# Patient Record
Sex: MALE | Race: BLACK OR AFRICAN AMERICAN | NOT HISPANIC OR LATINO
[De-identification: names, ages, dates, MRNs, and addresses within clinical notes are randomized per-mention and may not be internally consistent; named-entity substitution may affect disease eponyms.]

---

## 2017-08-29 ENCOUNTER — APPOINTMENT (OUTPATIENT)
Dept: GASTROENTEROLOGY | Facility: CLINIC | Age: 62
End: 2017-08-29
Payer: MEDICAID

## 2017-08-29 ENCOUNTER — LABORATORY RESULT (OUTPATIENT)
Age: 62
End: 2017-08-29

## 2017-08-29 VITALS
RESPIRATION RATE: 15 BRPM | WEIGHT: 190 LBS | SYSTOLIC BLOOD PRESSURE: 147 MMHG | TEMPERATURE: 98 F | HEART RATE: 90 BPM | HEIGHT: 69 IN | BODY MASS INDEX: 28.14 KG/M2 | DIASTOLIC BLOOD PRESSURE: 84 MMHG

## 2017-08-29 DIAGNOSIS — I85.01 ESOPHAGEAL VARICES WITH BLEEDING: ICD-10-CM

## 2017-08-29 DIAGNOSIS — Z86.79 PERSONAL HISTORY OF OTHER DISEASES OF THE CIRCULATORY SYSTEM: ICD-10-CM

## 2017-08-29 DIAGNOSIS — K74.60 UNSPECIFIED CIRRHOSIS OF LIVER: ICD-10-CM

## 2017-08-29 PROCEDURE — 99205 OFFICE O/P NEW HI 60 MIN: CPT

## 2017-08-29 RX ORDER — FOLIC ACID 1 MG/1
1 TABLET ORAL
Qty: 30 | Refills: 0 | Status: ACTIVE | COMMUNITY
Start: 2017-02-10

## 2017-08-30 LAB
ALBUMIN SERPL ELPH-MCNC: 3.2 G/DL
ALP BLD-CCNC: 91 U/L
ALT SERPL-CCNC: 50 U/L
ANION GAP SERPL CALC-SCNC: 20 MMOL/L
AST SERPL-CCNC: 176 U/L
BASOPHILS # BLD AUTO: 0.03 K/UL
BASOPHILS NFR BLD AUTO: 0.7 %
BILIRUB SERPL-MCNC: 1.5 MG/DL
BUN SERPL-MCNC: 24 MG/DL
CALCIUM SERPL-MCNC: 9.4 MG/DL
CHLORIDE SERPL-SCNC: 102 MMOL/L
CO2 SERPL-SCNC: 18 MMOL/L
CREAT SERPL-MCNC: 1.31 MG/DL
EOSINOPHIL # BLD AUTO: 0.14 K/UL
EOSINOPHIL NFR BLD AUTO: 3.3 %
GLUCOSE SERPL-MCNC: 81 MG/DL
HCT VFR BLD CALC: 35 %
HGB BLD-MCNC: 11.4 G/DL
IMM GRANULOCYTES NFR BLD AUTO: 0.2 %
INR PPP: 1.16 RATIO
LYMPHOCYTES # BLD AUTO: 0.87 K/UL
LYMPHOCYTES NFR BLD AUTO: 20.7 %
MAN DIFF?: NORMAL
MCHC RBC-ENTMCNC: 32.6 GM/DL
MCHC RBC-ENTMCNC: 35.2 PG
MCV RBC AUTO: 108 FL
MONOCYTES # BLD AUTO: 0.52 K/UL
MONOCYTES NFR BLD AUTO: 12.4 %
NEUTROPHILS # BLD AUTO: 2.64 K/UL
NEUTROPHILS NFR BLD AUTO: 62.7 %
PLATELET # BLD AUTO: 78 K/UL
POTASSIUM SERPL-SCNC: 4.4 MMOL/L
PROT SERPL-MCNC: 9 G/DL
PT BLD: 13.2 SEC
RBC # BLD: 3.24 M/UL
RBC # FLD: 14.5 %
SODIUM SERPL-SCNC: 140 MMOL/L
WBC # FLD AUTO: 4.21 K/UL

## 2017-09-27 ENCOUNTER — OTHER (OUTPATIENT)
Age: 62
End: 2017-09-27

## 2017-09-27 ENCOUNTER — OUTPATIENT (OUTPATIENT)
Dept: OUTPATIENT SERVICES | Facility: HOSPITAL | Age: 62
LOS: 1 days | Discharge: HOME | End: 2017-09-27
Payer: MEDICAID

## 2017-09-27 DIAGNOSIS — I85.01 ESOPHAGEAL VARICES WITH BLEEDING: ICD-10-CM

## 2017-09-27 PROCEDURE — 43244 EGD VARICES LIGATION: CPT

## 2017-09-27 RX ORDER — CARVEDILOL 6.25 MG/1
6.25 TABLET, FILM COATED ORAL TWICE DAILY
Qty: 60 | Refills: 2 | Status: ACTIVE | COMMUNITY
Start: 2017-09-27 | End: 1900-01-01

## 2017-09-27 RX ORDER — CARVEDILOL 6.25 MG/1
6.25 TABLET, FILM COATED ORAL TWICE DAILY
Qty: 60 | Refills: 3 | Status: ACTIVE | COMMUNITY
Start: 2017-09-27 | End: 1900-01-01

## 2017-10-01 DIAGNOSIS — K76.6 PORTAL HYPERTENSION: ICD-10-CM

## 2017-10-01 DIAGNOSIS — D50.0 IRON DEFICIENCY ANEMIA SECONDARY TO BLOOD LOSS (CHRONIC): ICD-10-CM

## 2017-10-01 DIAGNOSIS — I85.01 ESOPHAGEAL VARICES WITH BLEEDING: ICD-10-CM

## 2017-10-01 DIAGNOSIS — F10.10 ALCOHOL ABUSE, UNCOMPLICATED: ICD-10-CM

## 2017-10-01 DIAGNOSIS — K25.9 GASTRIC ULCER, UNSPECIFIED AS ACUTE OR CHRONIC, WITHOUT HEMORRHAGE OR PERFORATION: ICD-10-CM

## 2017-10-01 DIAGNOSIS — K29.80 DUODENITIS WITHOUT BLEEDING: ICD-10-CM

## 2017-10-01 DIAGNOSIS — K31.89 OTHER DISEASES OF STOMACH AND DUODENUM: ICD-10-CM

## 2017-10-01 DIAGNOSIS — K74.60 UNSPECIFIED CIRRHOSIS OF LIVER: ICD-10-CM

## 2017-10-17 ENCOUNTER — LABORATORY RESULT (OUTPATIENT)
Age: 62
End: 2017-10-17

## 2017-10-17 ENCOUNTER — APPOINTMENT (OUTPATIENT)
Dept: GASTROENTEROLOGY | Facility: CLINIC | Age: 62
End: 2017-10-17
Payer: MEDICAID

## 2017-10-17 VITALS
SYSTOLIC BLOOD PRESSURE: 114 MMHG | TEMPERATURE: 97.8 F | BODY MASS INDEX: 26.66 KG/M2 | DIASTOLIC BLOOD PRESSURE: 68 MMHG | HEIGHT: 69 IN | HEART RATE: 74 BPM | WEIGHT: 180 LBS | RESPIRATION RATE: 15 BRPM

## 2017-10-17 PROCEDURE — 99215 OFFICE O/P EST HI 40 MIN: CPT

## 2017-10-18 LAB
ALBUMIN SERPL ELPH-MCNC: 3 G/DL
ALP BLD-CCNC: 82 U/L
ALT SERPL-CCNC: 26 U/L
ANION GAP SERPL CALC-SCNC: 12 MMOL/L
AST SERPL-CCNC: 100 U/L
BASOPHILS # BLD AUTO: 0.01 K/UL
BASOPHILS NFR BLD AUTO: 0.3 %
BILIRUB SERPL-MCNC: 2.5 MG/DL
BUN SERPL-MCNC: 29 MG/DL
CALCIUM SERPL-MCNC: 9.6 MG/DL
CHLORIDE SERPL-SCNC: 107 MMOL/L
CO2 SERPL-SCNC: 18 MMOL/L
CREAT SERPL-MCNC: 1.27 MG/DL
EOSINOPHIL # BLD AUTO: 0.18 K/UL
EOSINOPHIL NFR BLD AUTO: 4.7 %
GLUCOSE SERPL-MCNC: 89 MG/DL
HCT VFR BLD CALC: 30.8 %
HGB BLD-MCNC: 10.2 G/DL
IMM GRANULOCYTES NFR BLD AUTO: 0.3 %
INR PPP: 1.22 RATIO
LYMPHOCYTES # BLD AUTO: 0.64 K/UL
LYMPHOCYTES NFR BLD AUTO: 16.7 %
MAN DIFF?: NORMAL
MCHC RBC-ENTMCNC: 33.1 GM/DL
MCHC RBC-ENTMCNC: 36.4 PG
MCV RBC AUTO: 110 FL
MONOCYTES # BLD AUTO: 0.5 K/UL
MONOCYTES NFR BLD AUTO: 13 %
NEUTROPHILS # BLD AUTO: 2.5 K/UL
NEUTROPHILS NFR BLD AUTO: 65 %
PLATELET # BLD AUTO: 68 K/UL
POTASSIUM SERPL-SCNC: 4.5 MMOL/L
PROT SERPL-MCNC: 8.4 G/DL
PT BLD: 13.8 SEC
RBC # BLD: 2.8 M/UL
RBC # FLD: 14.8 %
SODIUM SERPL-SCNC: 137 MMOL/L
WBC # FLD AUTO: 3.84 K/UL

## 2017-10-20 LAB — UREA BREATH TEST QL: NEGATIVE

## 2017-11-13 ENCOUNTER — OUTPATIENT (OUTPATIENT)
Dept: OUTPATIENT SERVICES | Facility: HOSPITAL | Age: 62
LOS: 1 days | End: 2017-11-13
Payer: MEDICAID

## 2017-11-13 ENCOUNTER — OTHER (OUTPATIENT)
Age: 62
End: 2017-11-13

## 2017-11-13 DIAGNOSIS — I85.00 ESOPHAGEAL VARICES WITHOUT BLEEDING: ICD-10-CM

## 2017-11-13 PROCEDURE — 43244 EGD VARICES LIGATION: CPT

## 2017-11-13 RX ORDER — PANTOPRAZOLE 40 MG/1
40 TABLET, DELAYED RELEASE ORAL
Qty: 30 | Refills: 2 | Status: ACTIVE | COMMUNITY
Start: 2017-09-27 | End: 1900-01-01

## 2017-11-28 ENCOUNTER — LABORATORY RESULT (OUTPATIENT)
Age: 62
End: 2017-11-28

## 2017-11-28 ENCOUNTER — APPOINTMENT (OUTPATIENT)
Dept: GASTROENTEROLOGY | Facility: CLINIC | Age: 62
End: 2017-11-28
Payer: MEDICAID

## 2017-11-28 VITALS
BODY MASS INDEX: 26.81 KG/M2 | HEIGHT: 69 IN | TEMPERATURE: 98 F | SYSTOLIC BLOOD PRESSURE: 126 MMHG | RESPIRATION RATE: 16 BRPM | HEART RATE: 74 BPM | WEIGHT: 181 LBS | DIASTOLIC BLOOD PRESSURE: 78 MMHG

## 2017-11-28 PROCEDURE — 99214 OFFICE O/P EST MOD 30 MIN: CPT

## 2017-11-29 LAB
INR PPP: 1.25 RATIO
PT BLD: 14.2 SEC

## 2017-12-11 LAB
ALBUMIN SERPL ELPH-MCNC: 2.9 G/DL
ALP BLD-CCNC: 68 U/L
ALT SERPL-CCNC: 24 U/L
ANION GAP SERPL CALC-SCNC: 14 MMOL/L
AST SERPL-CCNC: 60 U/L
BASOPHILS # BLD AUTO: 0.02 K/UL
BASOPHILS NFR BLD AUTO: 0.5 %
BILIRUB SERPL-MCNC: 1.6 MG/DL
BUN SERPL-MCNC: 23 MG/DL
CALCIUM SERPL-MCNC: 10.4 MG/DL
CHLORIDE SERPL-SCNC: 104 MMOL/L
CO2 SERPL-SCNC: 22 MMOL/L
CREAT SERPL-MCNC: 1.33 MG/DL
EOSINOPHIL # BLD AUTO: 0.16 K/UL
EOSINOPHIL NFR BLD AUTO: 4.2 %
GLUCOSE SERPL-MCNC: 89 MG/DL
HCT VFR BLD CALC: 29.5 %
HGB BLD-MCNC: 10 G/DL
IMM GRANULOCYTES NFR BLD AUTO: 0.3 %
LYMPHOCYTES # BLD AUTO: 0.73 K/UL
LYMPHOCYTES NFR BLD AUTO: 19.2 %
MAN DIFF?: NORMAL
MCHC RBC-ENTMCNC: 33.9 GM/DL
MCHC RBC-ENTMCNC: 37.7 PG
MCV RBC AUTO: 111.3 FL
MONOCYTES # BLD AUTO: 0.43 K/UL
MONOCYTES NFR BLD AUTO: 11.3 %
NEUTROPHILS # BLD AUTO: 2.46 K/UL
NEUTROPHILS NFR BLD AUTO: 64.5 %
PLATELET # BLD AUTO: 60 K/UL
POTASSIUM SERPL-SCNC: 4 MMOL/L
PROT SERPL-MCNC: 8.3 G/DL
RBC # BLD: 2.65 M/UL
RBC # FLD: 13.8 %
SODIUM SERPL-SCNC: 140 MMOL/L
WBC # FLD AUTO: 3.81 K/UL

## 2018-01-22 ENCOUNTER — OUTPATIENT (OUTPATIENT)
Dept: OUTPATIENT SERVICES | Facility: HOSPITAL | Age: 63
LOS: 1 days | End: 2018-01-22
Payer: MEDICAID

## 2018-01-22 DIAGNOSIS — D64.9 ANEMIA, UNSPECIFIED: ICD-10-CM

## 2018-01-22 PROCEDURE — 43235 EGD DIAGNOSTIC BRUSH WASH: CPT

## 2018-01-22 PROCEDURE — 43239 EGD BIOPSY SINGLE/MULTIPLE: CPT

## 2018-02-15 ENCOUNTER — APPOINTMENT (OUTPATIENT)
Dept: GASTROENTEROLOGY | Facility: CLINIC | Age: 63
End: 2018-02-15
Payer: MEDICAID

## 2018-02-15 VITALS
SYSTOLIC BLOOD PRESSURE: 134 MMHG | HEART RATE: 90 BPM | DIASTOLIC BLOOD PRESSURE: 66 MMHG | TEMPERATURE: 98.8 F | BODY MASS INDEX: 26.81 KG/M2 | WEIGHT: 181 LBS | RESPIRATION RATE: 18 BRPM | HEIGHT: 69 IN

## 2018-02-15 PROCEDURE — 99214 OFFICE O/P EST MOD 30 MIN: CPT

## 2018-02-15 RX ORDER — CARVEDILOL 6.25 MG/1
6.25 TABLET, FILM COATED ORAL TWICE DAILY
Qty: 60 | Refills: 3 | Status: ACTIVE | COMMUNITY
Start: 2017-08-29 | End: 1900-01-01

## 2018-02-16 ENCOUNTER — EMERGENCY (EMERGENCY)
Facility: HOSPITAL | Age: 63
LOS: 1 days | Discharge: HOME | End: 2018-02-16
Attending: EMERGENCY MEDICINE | Admitting: EMERGENCY MEDICINE
Payer: MEDICAID

## 2018-02-16 VITALS
TEMPERATURE: 98 F | DIASTOLIC BLOOD PRESSURE: 60 MMHG | RESPIRATION RATE: 16 BRPM | SYSTOLIC BLOOD PRESSURE: 96 MMHG | HEART RATE: 77 BPM | OXYGEN SATURATION: 100 %

## 2018-02-16 PROCEDURE — 99284 EMERGENCY DEPT VISIT MOD MDM: CPT

## 2018-02-16 NOTE — ED ADULT TRIAGE NOTE - CHIEF COMPLAINT QUOTE
Arrives with ems, picked up on a street , patient called because he was feeling dizzy. Patient admits to having some drinks tonight, hypotensive in triage, denies any chest pain.

## 2018-02-17 VITALS
TEMPERATURE: 98 F | DIASTOLIC BLOOD PRESSURE: 60 MMHG | SYSTOLIC BLOOD PRESSURE: 100 MMHG | HEART RATE: 85 BPM | OXYGEN SATURATION: 97 % | RESPIRATION RATE: 16 BRPM

## 2018-02-17 LAB
ALBUMIN SERPL ELPH-MCNC: 2.8 G/DL — LOW (ref 3.3–5)
ALP SERPL-CCNC: 73 U/L — SIGNIFICANT CHANGE UP (ref 40–120)
ALT FLD-CCNC: 48 U/L — HIGH (ref 4–41)
ANISOCYTOSIS BLD QL: SLIGHT — SIGNIFICANT CHANGE UP
AST SERPL-CCNC: 135 U/L — HIGH (ref 4–40)
BASOPHILS # BLD AUTO: 0.03 K/UL — SIGNIFICANT CHANGE UP (ref 0–0.2)
BASOPHILS NFR BLD AUTO: 0.8 % — SIGNIFICANT CHANGE UP (ref 0–2)
BILIRUB SERPL-MCNC: 2.2 MG/DL — HIGH (ref 0.2–1.2)
BUN SERPL-MCNC: 36 MG/DL — HIGH (ref 7–23)
CALCIUM SERPL-MCNC: 7.5 MG/DL — LOW (ref 8.4–10.5)
CHLORIDE SERPL-SCNC: 102 MMOL/L — SIGNIFICANT CHANGE UP (ref 98–107)
CO2 SERPL-SCNC: 16 MMOL/L — LOW (ref 22–31)
CREAT SERPL-MCNC: 2.88 MG/DL — HIGH (ref 0.5–1.3)
EOSINOPHIL # BLD AUTO: 0.07 K/UL — SIGNIFICANT CHANGE UP (ref 0–0.5)
EOSINOPHIL NFR BLD AUTO: 1.8 % — SIGNIFICANT CHANGE UP (ref 0–6)
GLUCOSE SERPL-MCNC: 106 MG/DL — HIGH (ref 70–99)
HCT VFR BLD CALC: 29.6 % — LOW (ref 39–50)
HGB BLD-MCNC: 9.9 G/DL — LOW (ref 13–17)
IMM GRANULOCYTES # BLD AUTO: 0.03 # — SIGNIFICANT CHANGE UP
IMM GRANULOCYTES NFR BLD AUTO: 0.8 % — SIGNIFICANT CHANGE UP (ref 0–1.5)
LYMPHOCYTES # BLD AUTO: 0.53 K/UL — LOW (ref 1–3.3)
LYMPHOCYTES # BLD AUTO: 13.9 % — SIGNIFICANT CHANGE UP (ref 13–44)
MACROCYTES BLD QL: SIGNIFICANT CHANGE UP
MANUAL SMEAR VERIFICATION: SIGNIFICANT CHANGE UP
MCHC RBC-ENTMCNC: 33.4 % — SIGNIFICANT CHANGE UP (ref 32–36)
MCHC RBC-ENTMCNC: 36.1 PG — HIGH (ref 27–34)
MCV RBC AUTO: 108 FL — HIGH (ref 80–100)
MONOCYTES # BLD AUTO: 0.61 K/UL — SIGNIFICANT CHANGE UP (ref 0–0.9)
MONOCYTES NFR BLD AUTO: 16.1 % — HIGH (ref 2–14)
NEUTROPHILS # BLD AUTO: 2.53 K/UL — SIGNIFICANT CHANGE UP (ref 1.8–7.4)
NEUTROPHILS NFR BLD AUTO: 66.6 % — SIGNIFICANT CHANGE UP (ref 43–77)
NRBC # FLD: 0 — SIGNIFICANT CHANGE UP
PLATELET # BLD AUTO: 49 K/UL — LOW (ref 150–400)
PLATELET COUNT - ESTIMATE: SIGNIFICANT CHANGE UP
PMV BLD: 10.6 FL — SIGNIFICANT CHANGE UP (ref 7–13)
POLYCHROMASIA BLD QL SMEAR: SLIGHT — SIGNIFICANT CHANGE UP
POTASSIUM SERPL-MCNC: 4.2 MMOL/L — SIGNIFICANT CHANGE UP (ref 3.5–5.3)
POTASSIUM SERPL-SCNC: 4.2 MMOL/L — SIGNIFICANT CHANGE UP (ref 3.5–5.3)
PROT SERPL-MCNC: 8.1 G/DL — SIGNIFICANT CHANGE UP (ref 6–8.3)
RBC # BLD: 2.74 M/UL — LOW (ref 4.2–5.8)
RBC # FLD: 15.1 % — HIGH (ref 10.3–14.5)
SODIUM SERPL-SCNC: 138 MMOL/L — SIGNIFICANT CHANGE UP (ref 135–145)
WBC # BLD: 3.8 K/UL — SIGNIFICANT CHANGE UP (ref 3.8–10.5)
WBC # FLD AUTO: 3.8 K/UL — SIGNIFICANT CHANGE UP (ref 3.8–10.5)

## 2018-02-17 RX ORDER — SODIUM CHLORIDE 9 MG/ML
2000 INJECTION INTRAMUSCULAR; INTRAVENOUS; SUBCUTANEOUS ONCE
Qty: 0 | Refills: 0 | Status: COMPLETED | OUTPATIENT
Start: 2018-02-17 | End: 2018-02-17

## 2018-02-17 RX ADMIN — SODIUM CHLORIDE 2000 MILLILITER(S): 9 INJECTION INTRAMUSCULAR; INTRAVENOUS; SUBCUTANEOUS at 03:33

## 2018-02-17 NOTE — ED PROVIDER NOTE - MEDICAL DECISION MAKING DETAILS
62M ETOH abuse p/w dizziness 62M ETOH abuse p/w dizziness likely 2/2 ETOH use. Low concern for cardiac etiology. No signs of obvious trauma. Will observe, give IVF and reassess

## 2018-02-17 NOTE — ED PROVIDER NOTE - OBJECTIVE STATEMENT
62M PMH ETOH abuse with cirrhosis BIBEMS after feeling lightheaded without vertiginous sensation and calling 911. Denies chest pain, abd pain, n/v/d. Pt endorses recent procedure but unsure what. Pt reports he drank "one vodka" today and usually drinks two vodkas daily.

## 2018-02-17 NOTE — ED PROVIDER NOTE - ATTENDING CONTRIBUTION TO CARE
DR. MURGUIA, ATTENDING MD-  I performed a face to face bedside interview with patient regarding history of present illness, review of symptoms and past medical history. I completed an independent physical exam.  I have discussed patient's plan of care with the resident.   Documentation as above in the note.    61 y/o male with h/o etoh abuse, cirrhosis bibems for lightheadedness this pm.  Gradual onset, intermittent, lasts few seconds, about once an hour.  States he drinks vodka daily, admits to two shots tonight.  Denies f/c, ha, neck stiffness, cp, sob, cough, abd pain, n/v/d, dysuria, rash.  Afebrile, vs wnl, dry mm, nad, etoh on breath, ctabil, s1s2 rrr no m/r/g, abd soft non ttp no r/g, no cva tenderness b/l, no leg swelling b/l, no rash.  Eval for anemia vs dehydration vs lyte abn.  Obtain cbc, bmp, give ivf bolus, reassess.

## 2018-02-17 NOTE — ED PROVIDER NOTE - CARE PLAN
Principal Discharge DX:	Lightheaded Principal Discharge DX:	Lightheaded  Assessment and plan of treatment:	Follow up with your PMD in 48 hours. Return to the ED if dizziness persists, you have chest pain, shortness of breath, vomiting, or any other concerning symptom.

## 2018-02-17 NOTE — ED ADULT NURSE REASSESSMENT NOTE - NS ED NURSE REASSESS COMMENT FT1
No acute distress at present. Respirations are even and unlabored on room air. Pt. is being discharged. Discharge teaching and IV removed by MD Conroy.

## 2018-02-17 NOTE — ED PROVIDER NOTE - PLAN OF CARE
Follow up with your PMD in 48 hours. Return to the ED if dizziness persists, you have chest pain, shortness of breath, vomiting, or any other concerning symptom.

## 2018-02-17 NOTE — ED ADULT NURSE NOTE - OBJECTIVE STATEMENT
Pt received A&Ox3 to ED Rm 28 with c/o dizziness/jitteriness today. Denies LOC, falls, CP, SOB or any other adverse symptom at this time. States he went in for a f/u to his endoscopy from a few weeks ago & was told everything was good, but began to feel weak today. Admits to drinking 3 vodkas earlier. Jaundice noted to bilat eyes. RR equal & unlabored. Awaiting MD orders.

## 2018-02-17 NOTE — ED ADULT NURSE REASSESSMENT NOTE - NS ED NURSE REASSESS COMMENT FT1
No acute distress at present. Respirations are even and unlabored on room air. VS as noted. Will continue to monitor.

## 2018-02-24 ENCOUNTER — EMERGENCY (EMERGENCY)
Facility: HOSPITAL | Age: 63
LOS: 1 days | Discharge: LEFT BEFORE DISCHARGE | End: 2018-02-24
Attending: EMERGENCY MEDICINE | Admitting: EMERGENCY MEDICINE
Payer: MEDICAID

## 2018-02-24 VITALS
RESPIRATION RATE: 16 BRPM | TEMPERATURE: 98 F | HEART RATE: 78 BPM | OXYGEN SATURATION: 97 % | DIASTOLIC BLOOD PRESSURE: 90 MMHG | SYSTOLIC BLOOD PRESSURE: 117 MMHG

## 2018-02-24 PROCEDURE — 99283 EMERGENCY DEPT VISIT LOW MDM: CPT

## 2018-02-24 NOTE — ED PROVIDER NOTE - OBJECTIVE STATEMENT
63yo male PMH ETOH abuse with cirrhosis, HTN, gout, BIBEMS for alcohol intoxication. Patient states that he drank 2 vodka drinks today, last drink 2pm. Patient states that he was dizzy earlier today but is now feeling fine. No chest pain, shortness of breath, palpitations, nausea, vomiting, diarrhea, weakness. Denies falling. patient seen yesterday for same complaint.

## 2018-02-24 NOTE — ED ADULT NURSE NOTE - OBJECTIVE STATEMENT
Pt. received in room 26. Pt. is A&O x3 and ambulatory at baseline. Pt. Pt. received in room 26. Pt. is A&O x3 and ambulatory at baseline. Pt. reports drinking "drinks of vodka today," last drink at 2 pm. Pt. also reports feeling dizzy earlier, denies dizziness at present. Pt. denies chest pain, SOB, palpitations, abdominal pain, n/v/d, weakness, falls, trauma. Respirations are even and unlabored on room air. Pt. belongings (1 pair of shoes, 1 pair of socks, 1 jacket, 1 belt, 2 pairs of pants, 2 shirts, 1 hat, 1 sweater) 2 bags secured in . 1 nail clipper, 1 cell phone, 1 [place] card, and money secured with security with MARYANNE Schilling.

## 2018-02-24 NOTE — ED ADULT TRIAGE NOTE - CHIEF COMPLAINT QUOTE
Pt brought in by EMS from street with ETOH intox; admits to "4 drinks of vodka." States intermit dizziness - none noted now. Denies pain or any other adverse symptoms at this time.

## 2018-02-24 NOTE — ED PROVIDER NOTE - PROGRESS NOTE DETAILS
Patient refused blood draws. Patient states that he would like to go home but he has no place to go. Spoke with charge RN, Sona, coming to speak to patient. Sona Sarabia DO The patient has the capacity to refuse further medical evaluation and care. I had a lengthy discussion with the patient in which appropriate further evaluation and treatment was offered to the patient, and they declined. The patient understands that as a consequence of this decision they may be at risk for clinical deterioration including permanent disability and death, and the patient verbalized this understanding. Clear return precautions were discussed. The patient was urged to seek follow-up care, with appropriate referrals made as needed.

## 2018-02-24 NOTE — ED PROVIDER NOTE - ATTENDING CONTRIBUTION TO CARE
Ariel  pt with h/o ETOH abuse  here c/o dizziness earlier after drinking which is now resolved and he wants to leave He presently has no complaints  Review of recent labs showed in creased creatinine  but pt refused labs  When I told him the reason, he told me he had been told that and wanted no further bloodwork at thiss time  Risks of kidney dysfunction explained  Ptr was alert and oriented x 3  answering questionas appropriately  with stable gait  clear lungs  benign abd  card RRR S1S2

## 2018-02-24 NOTE — ED PROVIDER NOTE - MEDICAL DECISION MAKING DETAILS
63yo male with PMH EtOH abuse, cirrhosis, HTN, presenting with intermittent dizziness. Found to have elevated creatinine during last ED visit, patient offered labs and medical evaluation, but declined. Patient is AAOx3, clinically sober, ambulating without difficulty. Will discharge AMA. Sona Sarabia DO

## 2018-02-24 NOTE — ED PROVIDER NOTE - PHYSICAL EXAMINATION
Gen: NAD, AOx3, slurring speech  Head: NCAT, no c-spine tenderness  HEENT: PERRL, oral mucosa moist  Lung: CTAB, no respiratory distress, no wheezing, rales, rhonchi  CV: normal s1/s2, rrr, no murmurs, Normal perfusion  Abd: soft, NTND  MSK: No edema, no visible deformities, full range of motion in all 4 extremities  Neuro: CN II-XII grossly intact, No focal neurologic deficits, moving all extremities, normal strength  Skin: No rash

## 2018-02-25 VITALS
HEART RATE: 76 BPM | DIASTOLIC BLOOD PRESSURE: 60 MMHG | SYSTOLIC BLOOD PRESSURE: 107 MMHG | TEMPERATURE: 98 F | RESPIRATION RATE: 16 BRPM | OXYGEN SATURATION: 96 %

## 2018-02-25 RX ORDER — ACETAMINOPHEN 500 MG
650 TABLET ORAL ONCE
Qty: 0 | Refills: 0 | Status: COMPLETED | OUTPATIENT
Start: 2018-02-25 | End: 2018-02-25

## 2018-02-25 NOTE — ED ADULT NURSE REASSESSMENT NOTE - NS ED NURSE REASSESS COMMENT FT1
No acute distress at present. Respirations are even and unlabored on room air. Pt. continues to refuse blood work. MD Sarabia made aware. Pt. is leaving against medical advice. Teaching done by MD Sarabia. No acute distress at present. Respirations are even and unlabored on room air. Pt. continues to refuse blood work. MD Sarabia made aware. Pt. is leaving against medical advice. Teaching done by MD Sarabia. Pt. is awaiting social work. Charge RN made aware. Report received from break coverage RN. No acute distress at present. Respirations are even and unlabored on room air. Pt. continues to refuse blood work. MD Sarabia made aware. Pt. is leaving against medical advice. Teaching done by MD Sarabia. Pt. is awaiting social work. Charge RN made aware.

## 2018-02-25 NOTE — PROVIDER CONTACT NOTE (OTHER) - ASSESSMENT
Medical team referred this case as pt has been medically cleared for discharge. Writer met with the pt at Bed #29   and pt stated “ I have no place to go and needs shelter in [place]”.  Writer educated the pt about [facility]. Address - [phone]), [place] [employer]: [address] was given to the pt . Pt was also educated and encouraged the pt go [facility] for safety concern and his own wellbeing.   After this pt agreed to [facility] and address was given to the pt. Pt denies any suicidal or homicidal thoughts at this time.  Pt is alert, ambulatory and oriented X4. Writer provided the pt with a metro card # [phone]. Medical team was notified about this intervention.

## 2018-02-27 ENCOUNTER — EMERGENCY (EMERGENCY)
Facility: HOSPITAL | Age: 63
LOS: 1 days | Discharge: HOME | End: 2018-02-27
Attending: EMERGENCY MEDICINE | Admitting: EMERGENCY MEDICINE
Payer: MEDICAID

## 2018-02-27 VITALS
OXYGEN SATURATION: 99 % | RESPIRATION RATE: 18 BRPM | TEMPERATURE: 98 F | HEART RATE: 104 BPM | SYSTOLIC BLOOD PRESSURE: 147 MMHG | DIASTOLIC BLOOD PRESSURE: 81 MMHG

## 2018-02-27 VITALS
TEMPERATURE: 98 F | HEART RATE: 99 BPM | OXYGEN SATURATION: 99 % | DIASTOLIC BLOOD PRESSURE: 78 MMHG | RESPIRATION RATE: 14 BRPM | SYSTOLIC BLOOD PRESSURE: 144 MMHG

## 2018-02-27 PROCEDURE — 93010 ELECTROCARDIOGRAM REPORT: CPT

## 2018-02-27 PROCEDURE — 99283 EMERGENCY DEPT VISIT LOW MDM: CPT | Mod: 25

## 2018-02-27 RX ORDER — THIAMINE MONONITRATE (VIT B1) 100 MG
100 TABLET ORAL ONCE
Qty: 0 | Refills: 0 | Status: COMPLETED | OUTPATIENT
Start: 2018-02-27 | End: 2018-02-27

## 2018-02-27 RX ORDER — FOLIC ACID 0.8 MG
1 TABLET ORAL ONCE
Qty: 0 | Refills: 0 | Status: COMPLETED | OUTPATIENT
Start: 2018-02-27 | End: 2018-02-27

## 2018-02-27 RX ADMIN — Medication 1 TABLET(S): at 05:27

## 2018-02-27 RX ADMIN — Medication 100 MILLIGRAM(S): at 05:28

## 2018-02-27 RX ADMIN — Medication 1 MILLIGRAM(S): at 05:27

## 2018-02-27 NOTE — ED PROVIDER NOTE - ATTENDING CONTRIBUTION TO CARE
I was physically present for the E/M service provided. I agree with above history, physical, and plan which I have reviewed and edited where appropriate. I was physically present for the key portions of the service provided.    62M PMH of alcohol abuse and cirrhosis presents requesting place to sleep. VS non-actionable. No current medical complaint sin ED. Lungs CTA. Heart RRR. Abd soft NTND. Neurologic exam: A&O x3, speech clear, JESUS, CN II-XII intact, motor strength +5/5 in all extremities, sensation equal bilaterally, finger-to-nose normal, gait steady. Has family in area. Plans to go to [place] for shelter assignment per CM recommendations from previous visit.

## 2018-02-27 NOTE — ED PROVIDER NOTE - MEDICAL DECISION MAKING DETAILS
How Severe Is Your Rash?: moderate
Is This A New Presentation, Or A Follow-Up?: Rash
62yom with no acute complaints, requesting place to stay until morning. Well appearing with an unremarkable exam. ECG w/ no signs of ischemic or dysrhythmia. Ambulates with a steady gait, no signs of alcohol withdrawal.

## 2018-02-27 NOTE — ED PROVIDER NOTE - OBJECTIVE STATEMENT
62yom daily alcohol user, cirrhosis, HTN presents to ED looking for place stay. States he had a single episode of "dizziness" yesterday afternoon. Says it lasted a few seconds and went away. Denies any vertigo or syncope. Has been seen for the same thing twice in the past week and was offered admission which pt refused. Denies any symptoms currently. States he has no place to go and it was cold so he called an ambulance. Home where he was staying was recently sold and he has had no residence since. States that he has arrangements for emergency housing in [place]. Had 2 drinks of vodka earlier. Denies any hx of withdrawal. 62yom daily alcohol user, cirrhosis, HTN presents to ED looking for place stay. States he had a single episode of "dizziness" yesterday afternoon. Says it lasted a few seconds and went away. Denies any vertigo or syncope. Has been seen for the same thing twice in the past week and was offered admission which pt refused. Denies any symptoms currently. States he has no place to go and it was cold so he called an ambulance. Home where he was staying was recently sold and he has had no residence since. States that he has arrangements for emergency housing in [place] which he plans to go to tomorrow (today e wants to go to his cousins house to shower and change clothes). Had "2 drinks of vodka" earlier. Denies any hx of withdrawal. No HA. No vision changes. No focal weakness.

## 2018-02-27 NOTE — ED ADULT TRIAGE NOTE - CHIEF COMPLAINT QUOTE
pt. c/o dizziness x 1 month, evaluated for the same complaint 2 days and dc'd.  Pt. admits to drinking 2 shots of vodka this afternoon.  PMHx HTN, Cirrhosis, gout.

## 2018-02-27 NOTE — ED ADULT TRIAGE NOTE - BP NONINVASIVE SYSTOLIC (MM HG)
How Severe Is Your Skin Lesion?: mild Has Your Skin Lesion Been Treated?: not been treated Is This A New Presentation, Or A Follow-Up?: Mole 147

## 2018-02-27 NOTE — ED ADULT NURSE NOTE - OBJECTIVE STATEMENT
Received on stretcher in intake 5. Sleeping, easily aroused by verbal stimuli, awake, A&Ox3, ambulatory without assistance, NAD observed, respirations even and unlabored on room air. 61 YO male denies PMH c/o "I was having a little dizziness today." Denies dizziness or any complaints at present.

## 2018-03-09 ENCOUNTER — INPATIENT (INPATIENT)
Facility: HOSPITAL | Age: 63
LOS: 5 days | Discharge: HOME HEALTH | End: 2018-03-15
Attending: HOSPITALIST | Admitting: HOSPITALIST
Payer: MEDICAID

## 2018-03-09 VITALS
OXYGEN SATURATION: 98 % | RESPIRATION RATE: 16 BRPM | HEART RATE: 93 BPM | TEMPERATURE: 99 F | SYSTOLIC BLOOD PRESSURE: 140 MMHG | DIASTOLIC BLOOD PRESSURE: 81 MMHG

## 2018-03-09 DIAGNOSIS — L03.90 CELLULITIS, UNSPECIFIED: ICD-10-CM

## 2018-03-09 DIAGNOSIS — D69.6 THROMBOCYTOPENIA, UNSPECIFIED: ICD-10-CM

## 2018-03-09 DIAGNOSIS — D68.9 COAGULATION DEFECT, UNSPECIFIED: ICD-10-CM

## 2018-03-09 DIAGNOSIS — L03.116 CELLULITIS OF LEFT LOWER LIMB: ICD-10-CM

## 2018-03-09 DIAGNOSIS — Z29.9 ENCOUNTER FOR PROPHYLACTIC MEASURES, UNSPECIFIED: ICD-10-CM

## 2018-03-09 DIAGNOSIS — K70.30 ALCOHOLIC CIRRHOSIS OF LIVER WITHOUT ASCITES: ICD-10-CM

## 2018-03-09 DIAGNOSIS — I10 ESSENTIAL (PRIMARY) HYPERTENSION: ICD-10-CM

## 2018-03-09 DIAGNOSIS — D64.9 ANEMIA, UNSPECIFIED: ICD-10-CM

## 2018-03-09 LAB
ALBUMIN SERPL ELPH-MCNC: 2.4 G/DL — LOW (ref 3.3–5)
ALP SERPL-CCNC: 68 U/L — SIGNIFICANT CHANGE UP (ref 40–120)
ALT FLD-CCNC: 27 U/L — SIGNIFICANT CHANGE UP (ref 4–41)
ANISOCYTOSIS BLD QL: SIGNIFICANT CHANGE UP
APTT BLD: 33.5 SEC — SIGNIFICANT CHANGE UP (ref 27.5–37.4)
AST SERPL-CCNC: 95 U/L — HIGH (ref 4–40)
BASOPHILS # BLD AUTO: 0.02 K/UL — SIGNIFICANT CHANGE UP (ref 0–0.2)
BASOPHILS NFR BLD AUTO: 0.5 % — SIGNIFICANT CHANGE UP (ref 0–2)
BASOPHILS NFR SPEC: 1.9 % — SIGNIFICANT CHANGE UP (ref 0–2)
BILIRUB SERPL-MCNC: 3.7 MG/DL — HIGH (ref 0.2–1.2)
BLASTS # FLD: 0 % — SIGNIFICANT CHANGE UP (ref 0–0)
BUN SERPL-MCNC: 12 MG/DL — SIGNIFICANT CHANGE UP (ref 7–23)
CALCIUM SERPL-MCNC: 8 MG/DL — LOW (ref 8.4–10.5)
CHLORIDE SERPL-SCNC: 104 MMOL/L — SIGNIFICANT CHANGE UP (ref 98–107)
CO2 SERPL-SCNC: 21 MMOL/L — LOW (ref 22–31)
CREAT SERPL-MCNC: 1.37 MG/DL — HIGH (ref 0.5–1.3)
EOSINOPHIL # BLD AUTO: 0.08 K/UL — SIGNIFICANT CHANGE UP (ref 0–0.5)
EOSINOPHIL NFR BLD AUTO: 2.1 % — SIGNIFICANT CHANGE UP (ref 0–6)
EOSINOPHIL NFR FLD: 2.9 % — SIGNIFICANT CHANGE UP (ref 0–6)
GIANT PLATELETS BLD QL SMEAR: PRESENT — SIGNIFICANT CHANGE UP
GLUCOSE SERPL-MCNC: 84 MG/DL — SIGNIFICANT CHANGE UP (ref 70–99)
HCT VFR BLD CALC: 25.6 % — LOW (ref 39–50)
HGB BLD-MCNC: 8.7 G/DL — LOW (ref 13–17)
HYPOCHROMIA BLD QL: SLIGHT — SIGNIFICANT CHANGE UP
IMM GRANULOCYTES # BLD AUTO: 0.01 # — SIGNIFICANT CHANGE UP
IMM GRANULOCYTES NFR BLD AUTO: 0.3 % — SIGNIFICANT CHANGE UP (ref 0–1.5)
INR BLD: 1.89 — HIGH (ref 0.88–1.17)
LYMPHOCYTES # BLD AUTO: 0.55 K/UL — LOW (ref 1–3.3)
LYMPHOCYTES # BLD AUTO: 14.3 % — SIGNIFICANT CHANGE UP (ref 13–44)
LYMPHOCYTES NFR SPEC AUTO: 4.9 % — LOW (ref 13–44)
MACROCYTES BLD QL: SIGNIFICANT CHANGE UP
MCHC RBC-ENTMCNC: 34 % — SIGNIFICANT CHANGE UP (ref 32–36)
MCHC RBC-ENTMCNC: 35.8 PG — HIGH (ref 27–34)
MCV RBC AUTO: 105.3 FL — HIGH (ref 80–100)
METAMYELOCYTES # FLD: 0 % — SIGNIFICANT CHANGE UP (ref 0–1)
MONOCYTES # BLD AUTO: 0.66 K/UL — SIGNIFICANT CHANGE UP (ref 0–0.9)
MONOCYTES NFR BLD AUTO: 17.1 % — HIGH (ref 2–14)
MONOCYTES NFR BLD: 7.8 % — SIGNIFICANT CHANGE UP (ref 2–9)
MYELOCYTES NFR BLD: 1 % — HIGH (ref 0–0)
NEUTROPHIL AB SER-ACNC: 81.5 % — HIGH (ref 43–77)
NEUTROPHILS # BLD AUTO: 2.53 K/UL — SIGNIFICANT CHANGE UP (ref 1.8–7.4)
NEUTROPHILS NFR BLD AUTO: 65.7 % — SIGNIFICANT CHANGE UP (ref 43–77)
NEUTS BAND # BLD: 0 % — SIGNIFICANT CHANGE UP (ref 0–6)
NRBC # FLD: 0 — SIGNIFICANT CHANGE UP
OTHER - HEMATOLOGY %: 0 — SIGNIFICANT CHANGE UP
PLATELET # BLD AUTO: 38 K/UL — LOW (ref 150–400)
PLATELET COUNT - ESTIMATE: SIGNIFICANT CHANGE UP
PMV BLD: 11 FL — SIGNIFICANT CHANGE UP (ref 7–13)
POLYCHROMASIA BLD QL SMEAR: SLIGHT — SIGNIFICANT CHANGE UP
POTASSIUM SERPL-MCNC: 3.6 MMOL/L — SIGNIFICANT CHANGE UP (ref 3.5–5.3)
POTASSIUM SERPL-SCNC: 3.6 MMOL/L — SIGNIFICANT CHANGE UP (ref 3.5–5.3)
PROMYELOCYTES # FLD: 0 % — SIGNIFICANT CHANGE UP (ref 0–0)
PROT SERPL-MCNC: 7.1 G/DL — SIGNIFICANT CHANGE UP (ref 6–8.3)
PROTHROM AB SERPL-ACNC: 21.3 SEC — HIGH (ref 9.8–13.1)
RBC # BLD: 2.43 M/UL — LOW (ref 4.2–5.8)
RBC # FLD: 17.2 % — HIGH (ref 10.3–14.5)
SMUDGE CELLS # BLD: PRESENT — SIGNIFICANT CHANGE UP
SODIUM SERPL-SCNC: 138 MMOL/L — SIGNIFICANT CHANGE UP (ref 135–145)
TARGETS BLD QL SMEAR: SLIGHT — SIGNIFICANT CHANGE UP
VARIANT LYMPHS # BLD: 0 % — SIGNIFICANT CHANGE UP
WBC # BLD: 3.85 K/UL — SIGNIFICANT CHANGE UP (ref 3.8–10.5)
WBC # FLD AUTO: 3.85 K/UL — SIGNIFICANT CHANGE UP (ref 3.8–10.5)

## 2018-03-09 PROCEDURE — 99223 1ST HOSP IP/OBS HIGH 75: CPT

## 2018-03-09 RX ORDER — HEPARIN SODIUM 5000 [USP'U]/ML
5000 INJECTION INTRAVENOUS; SUBCUTANEOUS EVERY 8 HOURS
Qty: 0 | Refills: 0 | Status: DISCONTINUED | OUTPATIENT
Start: 2018-03-09 | End: 2018-03-09

## 2018-03-09 RX ORDER — FERROUS SULFATE 325(65) MG
325 TABLET ORAL DAILY
Qty: 0 | Refills: 0 | Status: DISCONTINUED | OUTPATIENT
Start: 2018-03-09 | End: 2018-03-15

## 2018-03-09 RX ORDER — AMLODIPINE BESYLATE 2.5 MG/1
2.5 TABLET ORAL DAILY
Qty: 0 | Refills: 0 | Status: DISCONTINUED | OUTPATIENT
Start: 2018-03-09 | End: 2018-03-12

## 2018-03-09 RX ORDER — FOLIC ACID 0.8 MG
1 TABLET ORAL DAILY
Qty: 0 | Refills: 0 | Status: DISCONTINUED | OUTPATIENT
Start: 2018-03-09 | End: 2018-03-15

## 2018-03-09 RX ORDER — OXYCODONE AND ACETAMINOPHEN 5; 325 MG/1; MG/1
1 TABLET ORAL ONCE
Qty: 0 | Refills: 0 | Status: DISCONTINUED | OUTPATIENT
Start: 2018-03-09 | End: 2018-03-09

## 2018-03-09 RX ORDER — PANTOPRAZOLE SODIUM 20 MG/1
40 TABLET, DELAYED RELEASE ORAL
Qty: 0 | Refills: 0 | Status: DISCONTINUED | OUTPATIENT
Start: 2018-03-09 | End: 2018-03-15

## 2018-03-09 RX ORDER — OXYCODONE AND ACETAMINOPHEN 5; 325 MG/1; MG/1
1 TABLET ORAL EVERY 4 HOURS
Qty: 0 | Refills: 0 | Status: DISCONTINUED | OUTPATIENT
Start: 2018-03-09 | End: 2018-03-10

## 2018-03-09 RX ORDER — METOPROLOL TARTRATE 50 MG
100 TABLET ORAL DAILY
Qty: 0 | Refills: 0 | Status: DISCONTINUED | OUTPATIENT
Start: 2018-03-09 | End: 2018-03-15

## 2018-03-09 RX ORDER — LOSARTAN POTASSIUM 100 MG/1
50 TABLET, FILM COATED ORAL DAILY
Qty: 0 | Refills: 0 | Status: DISCONTINUED | OUTPATIENT
Start: 2018-03-09 | End: 2018-03-12

## 2018-03-09 RX ORDER — HYDROCHLOROTHIAZIDE 25 MG
12.5 TABLET ORAL DAILY
Qty: 0 | Refills: 0 | Status: DISCONTINUED | OUTPATIENT
Start: 2018-03-09 | End: 2018-03-12

## 2018-03-09 RX ADMIN — OXYCODONE AND ACETAMINOPHEN 1 TABLET(S): 5; 325 TABLET ORAL at 18:17

## 2018-03-09 RX ADMIN — LOSARTAN POTASSIUM 50 MILLIGRAM(S): 100 TABLET, FILM COATED ORAL at 18:24

## 2018-03-09 RX ADMIN — OXYCODONE AND ACETAMINOPHEN 1 TABLET(S): 5; 325 TABLET ORAL at 14:32

## 2018-03-09 RX ADMIN — Medication 100 MILLIGRAM(S): at 18:24

## 2018-03-09 RX ADMIN — AMLODIPINE BESYLATE 2.5 MILLIGRAM(S): 2.5 TABLET ORAL at 18:24

## 2018-03-09 RX ADMIN — Medication 12.5 MILLIGRAM(S): at 18:23

## 2018-03-09 RX ADMIN — OXYCODONE AND ACETAMINOPHEN 1 TABLET(S): 5; 325 TABLET ORAL at 15:30

## 2018-03-09 RX ADMIN — Medication 325 MILLIGRAM(S): at 18:23

## 2018-03-09 RX ADMIN — Medication 100 MILLIGRAM(S): at 16:17

## 2018-03-09 RX ADMIN — OXYCODONE AND ACETAMINOPHEN 1 TABLET(S): 5; 325 TABLET ORAL at 19:26

## 2018-03-09 RX ADMIN — Medication 1 MILLIGRAM(S): at 18:23

## 2018-03-09 NOTE — H&P ADULT - PROBLEM SELECTOR PLAN 4
Patient with anemia in the setting of cirrhosis. Likely anemia of chronic disease. Check iron studies, c/w ferrous sulfate.

## 2018-03-09 NOTE — H&P ADULT - PROBLEM SELECTOR PLAN 7
IMPROVE VTE Individual Risk Assessment, Score = 2, and patient is cirrhotic, however patient has PLT of 38. Therefore for now would c/w SCDs for DVT ppx           RISK                                                          Points  [  ] Previous VTE                                               3  [  ] Thrombophilia                                            2  [  ] Lower limb paresis/paralysis                    2    [  ] Active Cancer (in last 6 months)              2   [ x ] Immobilization > 24 hrs                             1  [  ] ICU/CCU stay > 24 hours                            1  [ x ] Age > 60                                                        1                                            Total Score ____2_____

## 2018-03-09 NOTE — H&P ADULT - NSHPREVIEWOFSYSTEMS_GEN_ALL_CORE
REVIEW OF SYSTEMS:    CONSTITUTIONAL: No weakness, fevers or chills  EYES/ENT: No visual changes;  No vertigo or throat pain   NECK: No pain or stiffness  RESPIRATORY: No cough, wheezing, hemoptysis; No shortness of breath  CARDIOVASCULAR: No chest pain or palpitations  GASTROINTESTINAL: No abdominal or epigastric pain. No nausea, vomiting, or hematemesis; No diarrhea or constipation. No melena or hematochezia.  GENITOURINARY: No dysuria, frequency or hematuria  NEUROLOGICAL: No numbness or weakness  SKIN: No itching, burning, rashes, or lesions   All other review of systems is negative unless indicated above. REVIEW OF SYSTEMS:    CONSTITUTIONAL: No weakness, fevers or chills  EYES/ENT: No visual changes;  No vertigo or throat pain   NECK: No pain or stiffness  RESPIRATORY: No cough, wheezing, hemoptysis; No shortness of breath  CARDIOVASCULAR: No chest pain or palpitations  GASTROINTESTINAL: No abdominal or epigastric pain. No nausea, vomiting, or hematemesis; No diarrhea or constipation. No melena or hematochezia.  GENITOURINARY: No dysuria, frequency or hematuria  NEUROLOGICAL: No numbness or weakness  EXTREMETIES: (+) LLE swelling of knee and   SKIN: No itching, burning, lesions, (+) LLE rash    All other review of systems is negative unless indicated above.

## 2018-03-09 NOTE — CONSULT NOTE ADULT - ASSESSMENT
63 yo M w/ left lower extremity not associated with the foot  - Pt seen and examined  - No concern for any acute issues with the left foot or ankle  - No pod surg intervention at this time  - if pt would like to see a doc for any future foot or ankle issues he can follow up outpatient with Dr. Bravo call [phone] to schedule an appt

## 2018-03-09 NOTE — H&P ADULT - NSHPLABSRESULTS_GEN_ALL_CORE
CBC Full  -  ( 09 Mar 2018 16:14 )  WBC Count : 3.85 K/uL  Hemoglobin : 8.7 g/dL  Hematocrit : 25.6 %  Platelet Count - Automated : 38 K/uL  Mean Cell Volume : 105.3 fL  Mean Cell Hemoglobin : 35.8 pg  Mean Cell Hemoglobin Concentration : 34.0 %  Auto Neutrophil # : 2.53 K/uL  Auto Lymphocyte # : 0.55 K/uL  Auto Monocyte # : 0.66 K/uL  Auto Eosinophil # : 0.08 K/uL  Auto Basophil # : 0.02 K/uL  Auto Neutrophil % : 65.7 %  Auto Lymphocyte % : 14.3 %  Auto Monocyte % : 17.1 %  Auto Eosinophil % : 2.1 %  Auto Basophil % : 0.5 %    03-09    138  |  104  |  12  ----------------------------<  84  3.6   |  21<L>  |  1.37<H>    Ca    8.0<L>      09 Mar 2018 16:14    TPro  7.1  /  Alb  2.4<L>  /  TBili  3.7<H>  /  DBili  x   /  AST  95<H>  /  ALT  27  /  AlkPhos  68  03-09    PT/INR - ( 09 Mar 2018 16:14 )   PT: 21.3 SEC;   INR: 1.89     PTT - ( 09 Mar 2018 16:14 )  PTT:33.5 SEC    < from: 12 Lead ECG (02.27.18 @ 02:01) >  Ventricular Rate 94 BPM  Atrial Rate 94 BPM  P-R Interval 166 ms  QRS Duration 102 ms  Q-T Interval 432 ms  QTC Calculation(Bezet) 540 ms  P Axis 52 degrees  R Axis 52 degrees  T Axis 8 degrees  Diagnosis Line Normal sinus rhythm  Prolonged QT  Abnormal ECG  < end of copied text >

## 2018-03-09 NOTE — ED PROVIDER NOTE - MEDICAL DECISION MAKING DETAILS
62 yr male with pmhx of HTN, gout, cirrhosis, sciatic presents with left leg swelling, redness, and difficulty ambulating for the last 2 days. Pt seen by pmd- Dr. Chaparro this am, and sent in to ED for r/o DVT.: cbc, cmp, US r/o dvt, if negative treat as cellulitis Ricardo att: 62 yr male with pmhx of HTN, gout, cirrhosis, sciatic presents with left leg swelling, redness, and difficulty ambulating for the last 2 days. Pt seen by pmd- Dr. Chaparro this am, and sent in to ED for r/o DVT and POCUS sono is neg for DVT, therefore, will perform cbc, cmp, and if negative will admit for cellulitis

## 2018-03-09 NOTE — ED ADULT TRIAGE NOTE - CHIEF COMPLAINT QUOTE
Pt brought in by EMS for rash to left leg, sent in by MD for r/o DVT, redness and swelling noted, pt states area is tender, difficulty ambulating x 2 days. Denies sob/cp. Denies recent long travel/denies hx of dvts. Pt appears comfortable.

## 2018-03-09 NOTE — ED ADULT NURSE NOTE - OBJECTIVE STATEMENT
Received pt into spot #4 in intake area. No acute distress noted. Pt talking on cellphone smiling while talking. Pt presents with left lower ext swelling and redness patches. Positive pedal pulses strong bilaterally. Denies any long periods of time sitting or travelling. Denies falls or injuries. Pt refused blood work when bloodwork ordered by BLANCA Rose. Rose made aware. Bedside US done that did not show DVT. Leg very warm to touch .PA spoke to pt, pt consented to bloodwork. Bloodwork sent and clindamycin 900mg iniated as ordered.

## 2018-03-09 NOTE — ED PROVIDER NOTE - PROGRESS NOTE DETAILS
Bedside US negative for DVT. Pt to be admitted for leg cellulitis and iv abx. PMD- Dr. Chaparro called at , about admission, pt to be admitted to hospitalist. Dr. Spence called, and accepted pt. MAR text paged. Podiatry called for consult when pt admitted for underlying foot fungal infection.

## 2018-03-09 NOTE — ED PROVIDER NOTE - OBJECTIVE STATEMENT
62 yr male with pmhx of HTN, gout, cirrhosis, sciatic presents with left leg swelling, redness, and difficulty ambulating for the last 2 days. Pt seen by pmd- Dr. Chaparro this am, and sent in to ED for r/o DVT. Pt denies any chest pain, sob, fever, chills, injury or trauma or any other symptoms.

## 2018-03-09 NOTE — H&P ADULT - HISTORY OF PRESENT ILLNESS
62M hx of Gout, Hypertension, Cirrhosis, Sciatica presents to [facility] ED c/o 62M hx of Gout, Hypertension, Cirrhosis, Sciatica presents to [facility] ED c/o LLE pain. Patient says he noticed his LLE  (calf and knee) had swollen up with a new non-pruritic rash, warmth, and "squeezing pain" two days ago. This has never happened before. He was in his usual state of health prior to his leg swelling and pain. However because of the changes he has had progressive difficulty walking. No other complaints. He went to his PMD for evaluation, and due to concern for DVT he was sent to the ER.     In the ED patient underwent POCT US of LLE as well as received Clindamycin 900 mg IV x 1 (16:00) and Percocet 5-325 mg PO x 1

## 2018-03-09 NOTE — CONSULT NOTE ADULT - SUBJECTIVE AND OBJECTIVE BOX
Patient is a 62y old  Male who presents with a chief complaint of Cellulitis (09 Mar 2018 17:31)      HPI:  62M hx of Gout, Hypertension, Cirrhosis, Sciatica presents to [facility] ED c/o LLE pain. Patient says he noticed his LLE  (calf and knee) had swollen up with a new non-pruritic rash, warmth, and "squeezing pain" two days ago. This has never happened before. He was in his usual state of health prior to his leg swelling and pain. However because of the changes he has had progressive difficulty walking. No other complaints. He went to his PMD for evaluation, and due to concern for DVT he was sent to the ER.     Pt relates no pain to left foot, and denies pain. Pt has no pedal or ankle complaints at this time.    In the ED patient underwent POCT US of LLE as well as received Clindamycin 900 mg IV x 1 (16:00) and Percocet 5-325 mg PO x 1 (09 Mar 2018 17:31)      PAST MEDICAL & SURGICAL HISTORY:  HTN (hypertension)  Cirrhosis  Sciatica  Gout  No significant past surgical history      MEDICATIONS  (STANDING):  amLODIPine   Tablet 2.5 milliGRAM(s) Oral daily  clindamycin IVPB 600 milliGRAM(s) IV Intermittent every 8 hours  ferrous    sulfate 325 milliGRAM(s) Oral daily  folic acid 1 milliGRAM(s) Oral daily  hydrochlorothiazide 12.5 milliGRAM(s) Oral daily  losartan 50 milliGRAM(s) Oral daily  metoprolol succinate  milliGRAM(s) Oral daily  pantoprazole    Tablet 40 milliGRAM(s) Oral before breakfast    MEDICATIONS  (PRN):  oxyCODONE    5 mG/acetaminophen 325 mG 1 Tablet(s) Oral every 4 hours PRN Severe Pain (7 - 10)      Allergies    No Known Allergies    Intolerances        VITALS:    Vital Signs Last 24 Hrs  T(C): 36.8 (09 Mar 2018 18:15), Max: 37.4 (09 Mar 2018 13:18)  T(F): 98.2 (09 Mar 2018 18:15), Max: 99.4 (09 Mar 2018 13:18)  HR: 84 (09 Mar 2018 18:15) (82 - 93)  BP: 153/98 (09 Mar 2018 18:15) (140/81 - 153/98)  BP(mean): --  RR: 18 (09 Mar 2018 18:15) (16 - 18)  SpO2: 96% (09 Mar 2018 18:15) (96% - 98%)    LABS:                          8.7    3.85  )-----------( 38       ( 09 Mar 2018 16:14 )             25.6       03-09    138  |  104  |  12  ----------------------------<  84  3.6   |  21<L>  |  1.37<H>    Ca    8.0<L>      09 Mar 2018 16:14    TPro  7.1  /  Alb  2.4<L>  /  TBili  3.7<H>  /  DBili  x   /  AST  95<H>  /  ALT  27  /  AlkPhos  68  03-09      CAPILLARY BLOOD GLUCOSE          PT/INR - ( 09 Mar 2018 16:14 )   PT: 21.3 SEC;   INR: 1.89          PTT - ( 09 Mar 2018 16:14 )  PTT:33.5 SEC    LOWER EXTREMITY PHYSICAL EXAM:    Vasular: DP/PT 2/4, B/L, CFT <3 seconds B/L, Temperature gradient warm, B/L.   Neuro: Epicritic sensation intact to the level of , B/L feet  Musculoskeletal/Ortho: no gross deformities  Skin: erythema starting at proximal 1/3 of tibia and extending into the thigh. no erythema of the foot, or lower leg. Skin of foot intact, no abrasions, lesions, or open wounds

## 2018-03-09 NOTE — ED PROVIDER NOTE - PHYSICAL EXAMINATION
left leg: positive calf swelling and tenderness, with erythema extending to femur/ knee, positive 2 + distal pulses. left leg: positive calf swelling and tenderness, with erythema extending to femur/ knee, positive 2 + distal pulses.  positive fungal infection to left foot.

## 2018-03-09 NOTE — H&P ADULT - PROBLEM SELECTOR PLAN 1
DDx includes DVT versus cellulitis versus septic arthritis. Preliminary POCT US by ER was negative for DVT. For now I would c/w abx for cellulitis, f/u BCx, and monitor vitals and CBC.   - Will evaluate daily gait, rash, and pain. If patient not improving clinically, may need to escalate abx and acquire imaging and orthopedic consult for septic arthritis of right knee.   - Will obtain X-ray of left knee as baseline for now.   - c/w Clindamycin 600 mg IV q8h

## 2018-03-09 NOTE — H&P ADULT - PROBLEM SELECTOR PLAN 2
Hx of cirrhosis 2/2 alcoholism. Says he had recent EGD in february but unsure of results. Will continue to monitor clinical status, no asterixis on exam or encephalopathy   - MELD Score = 23   - Patient with distended abdomen would check US Abdomen to evaluate for ascites   - Check HCV/HBV/HAV  - Trend Coagulation daily with Bilirubin, Na+, and Creatinine

## 2018-03-09 NOTE — H&P ADULT - ASSESSMENT
62M hx of Gout, Hypertension, Cirrhosis, Sciatica being admitted for suspicion of cellulitis with progressive difficulty in walking. HD stable.

## 2018-03-09 NOTE — H&P ADULT - NSHPPHYSICALEXAM_GEN_ALL_CORE
Vital Signs Last 24 Hrs  T(C): 37.4 (09 Mar 2018 13:18), Max: 37.4 (09 Mar 2018 13:18)  T(F): 99.4 (09 Mar 2018 13:18), Max: 99.4 (09 Mar 2018 13:18)  HR: 82 (09 Mar 2018 17:16) (82 - 93)  BP: 143/98 (09 Mar 2018 17:16) (140/81 - 143/98)  BP(mean): --  RR: 17 (09 Mar 2018 17:16) (16 - 17)  SpO2: 98% (09 Mar 2018 17:16) (98% - 98%)    General: NAD, non-toxic appearing, speaking in full sentences   HEENT: EOMI, PERRLA, no conjunctival pallor, MMM, no JVD, no thyromegaly, neck supple, trachea midline  CV: S1S2 RRR no MRG  Lungs: CTA BL  Abdomen: soft NTND +BS   Extremities: No CCE +WWP  Skin/MSK: No rashes, preserved ROM on active & passive movement  Neuro: AAOx3, no focal deficits (5/5 strength all extremities), no sensory deficits Vital Signs Last 24 Hrs  T(C): 37.4 (09 Mar 2018 13:18), Max: 37.4 (09 Mar 2018 13:18)  T(F): 99.4 (09 Mar 2018 13:18), Max: 99.4 (09 Mar 2018 13:18)  HR: 82 (09 Mar 2018 17:16) (82 - 93)  BP: 143/98 (09 Mar 2018 17:16) (140/81 - 143/98)  BP(mean): --  RR: 17 (09 Mar 2018 17:16) (16 - 17)  SpO2: 98% (09 Mar 2018 17:16) (98% - 98%)    General: NAD, non-toxic appearing, speaking in full sentences, unkempt   HEENT: EOMI, PERRLA, no conjunctival pallor, MMM, no JVD, no thyromegaly, neck supple, trachea midline  CV: S1S2 RRR no MRG  Lungs: CTA BL  Abdomen: soft NT +BS, distended abdomen    Extremities: No clubbing or cyanosis. LLE swollen at left knee, left calf, and left thigh, +TTP of calf and knee, erythematous rash of left thigh and left anterior shin   Skin/MSK: Rash of LLE, feet are dry, unkempt; decreased ROM of left knee 2/2 pain   Neuro: AAOx3, no focal deficits (5/5 strength all extremities), no sensory deficits, no asterixis Vital Signs Last 24 Hrs  T(C): 37.4 (09 Mar 2018 13:18), Max: 37.4 (09 Mar 2018 13:18)  T(F): 99.4 (09 Mar 2018 13:18), Max: 99.4 (09 Mar 2018 13:18)  HR: 82 (09 Mar 2018 17:16) (82 - 93)  BP: 143/98 (09 Mar 2018 17:16) (140/81 - 143/98)  BP(mean): --  RR: 17 (09 Mar 2018 17:16) (16 - 17)  SpO2: 98% (09 Mar 2018 17:16) (98% - 98%)    General: NAD, non-toxic appearing, speaking in full sentences, unkempt   HEENT: EOMI, PERRLA, no conjunctival pallor, MMM, no JVD, no thyromegaly, neck supple, trachea midline  CV: S1S2 RRR no MRG  Lungs: CTA BL  Abdomen: soft NT +BS, distended abdomen    Extremities: No clubbing or cyanosis. LLE swollen at left knee, left calf, and left thigh, +TTP of calf and knee, non-blanching erythematous rash of left thigh and left anterior shin   Skin/MSK: Rash of LLE, feet are dry, unkempt; decreased ROM of left knee 2/2 pain   Neuro: AAOx3, no focal deficits (5/5 strength all extremities), no sensory deficits, no asterixis

## 2018-03-10 DIAGNOSIS — Z98.890 OTHER SPECIFIED POSTPROCEDURAL STATES: Chronic | ICD-10-CM

## 2018-03-10 DIAGNOSIS — R60.0 LOCALIZED EDEMA: ICD-10-CM

## 2018-03-10 LAB
ALBUMIN SERPL ELPH-MCNC: 2.4 G/DL — LOW (ref 3.3–5)
ALP SERPL-CCNC: 68 U/L — SIGNIFICANT CHANGE UP (ref 40–120)
ALT FLD-CCNC: 24 U/L — SIGNIFICANT CHANGE UP (ref 4–41)
APTT BLD: 35.5 SEC — SIGNIFICANT CHANGE UP (ref 27.5–37.4)
AST SERPL-CCNC: 88 U/L — HIGH (ref 4–40)
BASOPHILS # BLD AUTO: 0.03 K/UL — SIGNIFICANT CHANGE UP (ref 0–0.2)
BASOPHILS NFR BLD AUTO: 0.8 % — SIGNIFICANT CHANGE UP (ref 0–2)
BILIRUB SERPL-MCNC: 3.9 MG/DL — HIGH (ref 0.2–1.2)
BUN SERPL-MCNC: 12 MG/DL — SIGNIFICANT CHANGE UP (ref 7–23)
CALCIUM SERPL-MCNC: 8.1 MG/DL — LOW (ref 8.4–10.5)
CHLORIDE SERPL-SCNC: 101 MMOL/L — SIGNIFICANT CHANGE UP (ref 98–107)
CO2 SERPL-SCNC: 20 MMOL/L — LOW (ref 22–31)
CREAT SERPL-MCNC: 1.34 MG/DL — HIGH (ref 0.5–1.3)
EOSINOPHIL # BLD AUTO: 0.13 K/UL — SIGNIFICANT CHANGE UP (ref 0–0.5)
EOSINOPHIL NFR BLD AUTO: 3.3 % — SIGNIFICANT CHANGE UP (ref 0–6)
FERRITIN SERPL-MCNC: 324.8 NG/ML — SIGNIFICANT CHANGE UP (ref 30–400)
FOLATE SERPL-MCNC: 19 NG/ML — SIGNIFICANT CHANGE UP (ref 4.7–20)
GLUCOSE SERPL-MCNC: 80 MG/DL — SIGNIFICANT CHANGE UP (ref 70–99)
HAV IGM SER-ACNC: NONREACTIVE — SIGNIFICANT CHANGE UP
HBV CORE IGM SER-ACNC: NONREACTIVE — SIGNIFICANT CHANGE UP
HBV SURFACE AG SER-ACNC: NONREACTIVE — SIGNIFICANT CHANGE UP
HCT VFR BLD CALC: 27.8 % — LOW (ref 39–50)
HCV AB S/CO SERPL IA: 0.25 S/CO — SIGNIFICANT CHANGE UP
HCV AB SERPL-IMP: SIGNIFICANT CHANGE UP
HGB BLD-MCNC: 9.2 G/DL — LOW (ref 13–17)
IMM GRANULOCYTES # BLD AUTO: 0.03 # — SIGNIFICANT CHANGE UP
IMM GRANULOCYTES NFR BLD AUTO: 0.8 % — SIGNIFICANT CHANGE UP (ref 0–1.5)
INR BLD: 1.8 — HIGH (ref 0.88–1.17)
IRON SATN MFR SERPL: 173 UG/DL — SIGNIFICANT CHANGE UP (ref 155–535)
IRON SATN MFR SERPL: 57 UG/DL — SIGNIFICANT CHANGE UP (ref 45–165)
LDH SERPL L TO P-CCNC: 346 U/L — HIGH (ref 135–225)
LYMPHOCYTES # BLD AUTO: 0.54 K/UL — LOW (ref 1–3.3)
LYMPHOCYTES # BLD AUTO: 13.8 % — SIGNIFICANT CHANGE UP (ref 13–44)
MAGNESIUM SERPL-MCNC: 1.6 MG/DL — SIGNIFICANT CHANGE UP (ref 1.6–2.6)
MANUAL SMEAR VERIFICATION: SIGNIFICANT CHANGE UP
MCHC RBC-ENTMCNC: 33.1 % — SIGNIFICANT CHANGE UP (ref 32–36)
MCHC RBC-ENTMCNC: 35.5 PG — HIGH (ref 27–34)
MCV RBC AUTO: 107.3 FL — HIGH (ref 80–100)
MONOCYTES # BLD AUTO: 0.48 K/UL — SIGNIFICANT CHANGE UP (ref 0–0.9)
MONOCYTES NFR BLD AUTO: 12.2 % — SIGNIFICANT CHANGE UP (ref 2–14)
NEUTROPHILS # BLD AUTO: 2.71 K/UL — SIGNIFICANT CHANGE UP (ref 1.8–7.4)
NEUTROPHILS NFR BLD AUTO: 69.1 % — SIGNIFICANT CHANGE UP (ref 43–77)
NRBC # FLD: 0 — SIGNIFICANT CHANGE UP
PLATELET # BLD AUTO: 46 K/UL — LOW (ref 150–400)
PMV BLD: 11 FL — SIGNIFICANT CHANGE UP (ref 7–13)
POTASSIUM SERPL-MCNC: 4 MMOL/L — SIGNIFICANT CHANGE UP (ref 3.5–5.3)
POTASSIUM SERPL-SCNC: 4 MMOL/L — SIGNIFICANT CHANGE UP (ref 3.5–5.3)
PROT SERPL-MCNC: 7.3 G/DL — SIGNIFICANT CHANGE UP (ref 6–8.3)
PROTHROM AB SERPL-ACNC: 20.2 SEC — HIGH (ref 9.8–13.1)
RBC # BLD: 2.59 M/UL — LOW (ref 4.2–5.8)
RBC # FLD: 17.2 % — HIGH (ref 10.3–14.5)
RETICS #: 88 K/UL — SIGNIFICANT CHANGE UP (ref 25–125)
RETICS/RBC NFR: 3.4 % — HIGH (ref 0.5–2.5)
SODIUM SERPL-SCNC: 135 MMOL/L — SIGNIFICANT CHANGE UP (ref 135–145)
UIBC SERPL-MCNC: 116 UG/DL — SIGNIFICANT CHANGE UP (ref 110–370)
VIT B12 SERPL-MCNC: 2000 PG/ML — HIGH (ref 200–900)
WBC # BLD: 3.92 K/UL — SIGNIFICANT CHANGE UP (ref 3.8–10.5)
WBC # FLD AUTO: 3.92 K/UL — SIGNIFICANT CHANGE UP (ref 3.8–10.5)

## 2018-03-10 PROCEDURE — 99233 SBSQ HOSP IP/OBS HIGH 50: CPT

## 2018-03-10 RX ORDER — ACETAMINOPHEN 500 MG
650 TABLET ORAL EVERY 6 HOURS
Qty: 0 | Refills: 0 | Status: DISCONTINUED | OUTPATIENT
Start: 2018-03-10 | End: 2018-03-15

## 2018-03-10 RX ORDER — TRAMADOL HYDROCHLORIDE 50 MG/1
50 TABLET ORAL ONCE
Qty: 0 | Refills: 0 | Status: DISCONTINUED | OUTPATIENT
Start: 2018-03-10 | End: 2018-03-10

## 2018-03-10 RX ADMIN — Medication 100 MILLIGRAM(S): at 00:15

## 2018-03-10 RX ADMIN — Medication 12.5 MILLIGRAM(S): at 05:52

## 2018-03-10 RX ADMIN — PANTOPRAZOLE SODIUM 40 MILLIGRAM(S): 20 TABLET, DELAYED RELEASE ORAL at 05:52

## 2018-03-10 RX ADMIN — Medication 100 MILLIGRAM(S): at 05:52

## 2018-03-10 RX ADMIN — Medication 325 MILLIGRAM(S): at 13:00

## 2018-03-10 RX ADMIN — TRAMADOL HYDROCHLORIDE 50 MILLIGRAM(S): 50 TABLET ORAL at 18:30

## 2018-03-10 RX ADMIN — Medication 100 MILLIGRAM(S): at 06:57

## 2018-03-10 RX ADMIN — AMLODIPINE BESYLATE 2.5 MILLIGRAM(S): 2.5 TABLET ORAL at 05:52

## 2018-03-10 RX ADMIN — TRAMADOL HYDROCHLORIDE 50 MILLIGRAM(S): 50 TABLET ORAL at 17:36

## 2018-03-10 RX ADMIN — Medication 1 MILLIGRAM(S): at 13:04

## 2018-03-10 RX ADMIN — LOSARTAN POTASSIUM 50 MILLIGRAM(S): 100 TABLET, FILM COATED ORAL at 05:52

## 2018-03-10 NOTE — PROGRESS NOTE ADULT - SUBJECTIVE AND OBJECTIVE BOX
Patient is a 62y old  Male who presents with a chief complaint of Cellulitis (09 Mar 2018 17:31)      HPI:  62M hx of Gout, Hypertension, Cirrhosis, Sciatica presents to [facility] ED c/o LLE pain. Patient says he noticed his LLE  (calf and knee) had swollen up with a new non-pruritic rash, warmth, and "squeezing pain" two days ago. This has never happened before. He was in his usual state of health prior to his leg swelling and pain. However because of the changes he has had progressive difficulty walking. No other complaints. He went to his PMD for evaluation, and due to concern for DVT he was sent to the ER.     Pt relates no pain to left foot, and denies pain. Pt has no pedal or ankle complaints at this time.    In the ED patient underwent POCT US of LLE as well as received Clindamycin 900 mg IV x 1 (16:00) and Percocet 5-325 mg PO x 1 (09 Mar 2018 17:31)      PAST MEDICAL & SURGICAL HISTORY:  HTN (hypertension)  Cirrhosis  Sciatica  Gout  No significant past surgical history      MEDICATIONS  (STANDING):  amLODIPine   Tablet 2.5 milliGRAM(s) Oral daily  clindamycin IVPB 600 milliGRAM(s) IV Intermittent every 8 hours  ferrous    sulfate 325 milliGRAM(s) Oral daily  folic acid 1 milliGRAM(s) Oral daily  hydrochlorothiazide 12.5 milliGRAM(s) Oral daily  losartan 50 milliGRAM(s) Oral daily  metoprolol succinate  milliGRAM(s) Oral daily  pantoprazole    Tablet 40 milliGRAM(s) Oral before breakfast    MEDICATIONS  (PRN):  oxyCODONE    5 mG/acetaminophen 325 mG 1 Tablet(s) Oral every 4 hours PRN Severe Pain (7 - 10)      Allergies    No Known Allergies    Intolerances        VITALS:    Vital Signs Last 24 Hrs  T(C): 36.8 (09 Mar 2018 18:15), Max: 37.4 (09 Mar 2018 13:18)  T(F): 98.2 (09 Mar 2018 18:15), Max: 99.4 (09 Mar 2018 13:18)  HR: 84 (09 Mar 2018 18:15) (82 - 93)  BP: 153/98 (09 Mar 2018 18:15) (140/81 - 153/98)  BP(mean): --  RR: 18 (09 Mar 2018 18:15) (16 - 18)  SpO2: 96% (09 Mar 2018 18:15) (96% - 98%)    LABS:                          8.7    3.85  )-----------( 38       ( 09 Mar 2018 16:14 )             25.6       03-09    138  |  104  |  12  ----------------------------<  84  3.6   |  21<L>  |  1.37<H>    Ca    8.0<L>      09 Mar 2018 16:14    TPro  7.1  /  Alb  2.4<L>  /  TBili  3.7<H>  /  DBili  x   /  AST  95<H>  /  ALT  27  /  AlkPhos  68  03-09      CAPILLARY BLOOD GLUCOSE          PT/INR - ( 09 Mar 2018 16:14 )   PT: 21.3 SEC;   INR: 1.89          PTT - ( 09 Mar 2018 16:14 )  PTT:33.5 SEC    LOWER EXTREMITY PHYSICAL EXAM:    Vasular: DP/PT 2/4, B/L, CFT <3 seconds B/L, Temperature gradient warm, B/L.   Neuro: Epicritic sensation intact to the level of , B/L feet  Musculoskeletal/Ortho: no gross deformities  Skin: erythema starting at proximal 1/3 of tibia and extending into the thigh. +Fluctuance and pain over tibial tuberocity.  no erythema of the foot, or lower leg. Skin of foot intact, no abrasions, lesions, or open wounds

## 2018-03-10 NOTE — PROGRESS NOTE ADULT - ASSESSMENT
63 yo M w/ left lower extremity cellulitis/possible abscess(?) not associated with the foot  - Pt seen and examined  - No concern for any acute issues with the left foot or ankle  - Would rec ordering US or MRI of LLE to r/o abscess - ++fluctuance and pain over erythema anterior to tibial tuberocity  - No pod surg intervention at this time  - if pt would like to see a doc for any future foot or ankle issues he can follow up outpatient with Dr. Bravo call [phone] to schedule an appt

## 2018-03-10 NOTE — PROGRESS NOTE ADULT - ASSESSMENT
62M hx of Gout, Hypertension, Cirrhosis, Sciatica being admitted for suspicion of cellulitis with progressive difficulty in walking. HD stable. 62M hx of Gout, Hypertension, Cirrhosis, Sciatica with discolored and swollen left leg, with progressive difficulty in walking.

## 2018-03-10 NOTE — PROGRESS NOTE ADULT - PROBLEM SELECTOR PLAN 1
R/o DVT with LE dopplers (although POCT negative)  Rash does not look like cellulitis, more consistent with a  vasculitis, possibly LCV in setting of HCV infection.  f/u Hepatitis Ab tests, consider derm consult.  PT eval due to difficulty walking R/o DVT with LE dopplers (although POCT negative)  Rash does not look like cellulitis, more consistent with a  vasculitis, possibly LCV in setting of HCV infection.  f/u Hepatitis Ab tests, consider derm consult.  PT eval due to difficulty walking  Will stop abx

## 2018-03-10 NOTE — PROGRESS NOTE ADULT - PROBLEM SELECTOR PLAN 2
Hx of cirrhosis reportedly 2/2 alcoholism, patient denies etoh now but reported daily drinking 2/27/18 in ED.  Possible intermittent hepatic encephalopathy?  - MELD Score = 23    - f/u HCV/HBV/HAV Hx of cirrhosis reportedly 2/2 alcoholism, patient denies etoh now but reported daily drinking 2/27/18 in ED. Pt states he never said that, that he had been told to "cut down"   - MELD Score = 23    - f/u HCV/HBV/HAV Hx of cirrhosis reportedly 2/2 alcoholism, patient denies etoh now but reported daily drinking 2/27/18 in ED. Pt states he never said that, that he had been told to "cut down" on his medications, does not know which  - MELD Score = 23    - f/u HCV/HBV/HAV

## 2018-03-10 NOTE — PROGRESS NOTE ADULT - SUBJECTIVE AND OBJECTIVE BOX
Patient is a 62y old  Male who presents with a chief complaint of Pain on L leg (09 Mar 2018 23:39)      SUBJECTIVE / OVERNIGHT EVENTS: Patient says that left leg is still swollen and has not changed.  Reviewed with admitting hospitalist, He is a poor historian, seems to be unaware of why he was having EGDs and that he has cirrhosis.  Denies etoh use since GI bleed in july 2017 although there is an ED note 2/27/18 documenting that he says he is a daily drinker.     MEDICATIONS  (STANDING):  amLODIPine   Tablet 2.5 milliGRAM(s) Oral daily  clindamycin IVPB 600 milliGRAM(s) IV Intermittent every 8 hours  ferrous    sulfate 325 milliGRAM(s) Oral daily  folic acid 1 milliGRAM(s) Oral daily  hydrochlorothiazide 12.5 milliGRAM(s) Oral daily  losartan 50 milliGRAM(s) Oral daily  metoprolol succinate  milliGRAM(s) Oral daily  pantoprazole    Tablet 40 milliGRAM(s) Oral before breakfast    MEDICATIONS  (PRN):  oxyCODONE    5 mG/acetaminophen 325 mG 1 Tablet(s) Oral every 4 hours PRN Severe Pain (7 - 10)      Vital Signs Last 24 Hrs  T(C): 37.6 (10 Mar 2018 05:30), Max: 37.6 (10 Mar 2018 05:30)  T(F): 99.6 (10 Mar 2018 05:30), Max: 99.6 (10 Mar 2018 05:30)  HR: 83 (10 Mar 2018 05:30) (78 - 93)  BP: 120/85 (10 Mar 2018 05:30) (120/85 - 153/98)  BP(mean): --  RR: 18 (10 Mar 2018 05:30) (16 - 18)  SpO2: 96% (10 Mar 2018 05:30) (96% - 98%)  CAPILLARY BLOOD GLUCOSE        I&O's Summary      PHYSICAL EXAM:  GENERAL: NAD, well-developed  HEAD:  Atraumatic, Normocephalic  EYES: EOMI, PERRLA, conjunctiva and sclera clear  NECK: Supple, No JVD  CHEST/LUNG: Clear to auscultation bilaterally; No wheeze  HEART: Regular rate and rhythm; No murmurs, rubs, or gallops  ABDOMEN: Soft, Nontender, Nondistended; Bowel sounds present  EXTREMITIES:  2+ Peripheral Pulses, No clubbing, cyanosis, or edema  PSYCH: AAOx3  NEUROLOGY: non-focal  SKIN: LLE 1+ edema to thigh with extensive purplish patches, no erythema or warmth.     LABS:                        9.2    3.92  )-----------( 46       ( 10 Mar 2018 06:00 )             27.8     03-10    135  |  101  |  12  ----------------------------<  80  4.0   |  20<L>  |  1.34<H>    Ca    8.1<L>      10 Mar 2018 06:00  Mg     1.6     03-10    TPro  7.3  /  Alb  2.4<L>  /  TBili  3.9<H>  /  DBili  x   /  AST  88<H>  /  ALT  24  /  AlkPhos  68  03-10    PT/INR - ( 10 Mar 2018 06:00 )   PT: 20.2 SEC;   INR: 1.80          PTT - ( 10 Mar 2018 06:00 )  PTT:35.5 SEC          RADIOLOGY & ADDITIONAL TESTS:    Imaging Personally Reviewed:    Consultant(s) Notes Reviewed:  podiatry

## 2018-03-11 LAB
ALBUMIN SERPL ELPH-MCNC: 2 G/DL — LOW (ref 3.3–5)
ALP SERPL-CCNC: 59 U/L — SIGNIFICANT CHANGE UP (ref 40–120)
ALT FLD-CCNC: 22 U/L — SIGNIFICANT CHANGE UP (ref 4–41)
AST SERPL-CCNC: 72 U/L — HIGH (ref 4–40)
BASOPHILS # BLD AUTO: 0.03 K/UL — SIGNIFICANT CHANGE UP (ref 0–0.2)
BASOPHILS NFR BLD AUTO: 0.9 % — SIGNIFICANT CHANGE UP (ref 0–2)
BILIRUB SERPL-MCNC: 3.7 MG/DL — HIGH (ref 0.2–1.2)
BUN SERPL-MCNC: 18 MG/DL — SIGNIFICANT CHANGE UP (ref 7–23)
CALCIUM SERPL-MCNC: 8 MG/DL — LOW (ref 8.4–10.5)
CHLORIDE SERPL-SCNC: 101 MMOL/L — SIGNIFICANT CHANGE UP (ref 98–107)
CO2 SERPL-SCNC: 20 MMOL/L — LOW (ref 22–31)
CREAT SERPL-MCNC: 1.36 MG/DL — HIGH (ref 0.5–1.3)
EOSINOPHIL # BLD AUTO: 0.12 K/UL — SIGNIFICANT CHANGE UP (ref 0–0.5)
EOSINOPHIL NFR BLD AUTO: 3.5 % — SIGNIFICANT CHANGE UP (ref 0–6)
GLUCOSE SERPL-MCNC: 75 MG/DL — SIGNIFICANT CHANGE UP (ref 70–99)
HBV CORE AB SER-ACNC: NONREACTIVE — SIGNIFICANT CHANGE UP
HBV SURFACE AB SER-ACNC: NONREACTIVE — SIGNIFICANT CHANGE UP
HCT VFR BLD CALC: 25.9 % — LOW (ref 39–50)
HGB BLD-MCNC: 8.7 G/DL — LOW (ref 13–17)
HIV1 AG SER QL: SIGNIFICANT CHANGE UP
HIV1+2 AB SPEC QL: SIGNIFICANT CHANGE UP
IMM GRANULOCYTES # BLD AUTO: 0.02 # — SIGNIFICANT CHANGE UP
IMM GRANULOCYTES NFR BLD AUTO: 0.6 % — SIGNIFICANT CHANGE UP (ref 0–1.5)
LYMPHOCYTES # BLD AUTO: 0.68 K/UL — LOW (ref 1–3.3)
LYMPHOCYTES # BLD AUTO: 19.8 % — SIGNIFICANT CHANGE UP (ref 13–44)
MANUAL SMEAR VERIFICATION: SIGNIFICANT CHANGE UP
MCHC RBC-ENTMCNC: 33.6 % — SIGNIFICANT CHANGE UP (ref 32–36)
MCHC RBC-ENTMCNC: 35.2 PG — HIGH (ref 27–34)
MCV RBC AUTO: 104.9 FL — HIGH (ref 80–100)
MONOCYTES # BLD AUTO: 0.58 K/UL — SIGNIFICANT CHANGE UP (ref 0–0.9)
MONOCYTES NFR BLD AUTO: 16.9 % — HIGH (ref 2–14)
NEUTROPHILS # BLD AUTO: 2.01 K/UL — SIGNIFICANT CHANGE UP (ref 1.8–7.4)
NEUTROPHILS NFR BLD AUTO: 58.3 % — SIGNIFICANT CHANGE UP (ref 43–77)
NRBC # FLD: 0 — SIGNIFICANT CHANGE UP
PLATELET # BLD AUTO: 51 K/UL — LOW (ref 150–400)
PMV BLD: 10.8 FL — SIGNIFICANT CHANGE UP (ref 7–13)
POTASSIUM SERPL-MCNC: 3.8 MMOL/L — SIGNIFICANT CHANGE UP (ref 3.5–5.3)
POTASSIUM SERPL-SCNC: 3.8 MMOL/L — SIGNIFICANT CHANGE UP (ref 3.5–5.3)
PROT SERPL-MCNC: 6.8 G/DL — SIGNIFICANT CHANGE UP (ref 6–8.3)
RBC # BLD: 2.47 M/UL — LOW (ref 4.2–5.8)
RBC # FLD: 17.1 % — HIGH (ref 10.3–14.5)
SODIUM SERPL-SCNC: 133 MMOL/L — LOW (ref 135–145)
WBC # BLD: 3.44 K/UL — LOW (ref 3.8–10.5)
WBC # FLD AUTO: 3.44 K/UL — LOW (ref 3.8–10.5)

## 2018-03-11 PROCEDURE — 93970 EXTREMITY STUDY: CPT | Mod: 26

## 2018-03-11 PROCEDURE — 73562 X-RAY EXAM OF KNEE 3: CPT | Mod: 26,LT

## 2018-03-11 PROCEDURE — 99233 SBSQ HOSP IP/OBS HIGH 50: CPT

## 2018-03-11 RX ADMIN — PANTOPRAZOLE SODIUM 40 MILLIGRAM(S): 20 TABLET, DELAYED RELEASE ORAL at 07:05

## 2018-03-11 RX ADMIN — LOSARTAN POTASSIUM 50 MILLIGRAM(S): 100 TABLET, FILM COATED ORAL at 07:05

## 2018-03-11 RX ADMIN — Medication 12.5 MILLIGRAM(S): at 07:05

## 2018-03-11 RX ADMIN — Medication 325 MILLIGRAM(S): at 14:00

## 2018-03-11 RX ADMIN — Medication 100 MILLIGRAM(S): at 07:05

## 2018-03-11 RX ADMIN — Medication 1 MILLIGRAM(S): at 14:00

## 2018-03-11 RX ADMIN — AMLODIPINE BESYLATE 2.5 MILLIGRAM(S): 2.5 TABLET ORAL at 07:05

## 2018-03-11 NOTE — PROGRESS NOTE ADULT - PROBLEM SELECTOR PLAN 2
Hx of cirrhosis reportedly 2/2 alcoholism, patient denies etoh now but reported daily drinking 2/27/18 in ED. Pt states he never said that, that he had been told to "cut down" on his medications.  - MELD Score = 23    - stable, outpt f/u

## 2018-03-11 NOTE — PHYSICAL THERAPY INITIAL EVALUATION ADULT - PLANNED THERAPY INTERVENTIONS, PT EVAL
balance training/gait training/strengthening/transfer training/stair negotation/bed mobility training

## 2018-03-11 NOTE — PHYSICAL THERAPY INITIAL EVALUATION ADULT - DIAGNOSIS, PT EVAL
Deconditioning, decreased strength,decreased balance,decreased endurance,decreased postural control all limiting pts. ability to perform functional mobility

## 2018-03-11 NOTE — PHYSICAL THERAPY INITIAL EVALUATION ADULT - PERTINENT HX OF CURRENT PROBLEM, REHAB EVAL
Pt. is a 62 year old male admitted [facility] secondary to Cellulitus. PMh: Cirrhosis, HTN, sciatica

## 2018-03-11 NOTE — PHYSICAL THERAPY INITIAL EVALUATION ADULT - ADDITIONAL COMMENTS
Pt. lives in a pvt home alone. Pt. has steps with HR x 1 to enter their home. Pt. was previously ambulating household and community distances without the use of an AD independently. Pt. was previously independent with ADLs. Pt. returned to sitting at edge of bed at end of therapy session with all lines and tubes intact, call bell in reach and in NAD.

## 2018-03-11 NOTE — PROGRESS NOTE ADULT - SUBJECTIVE AND OBJECTIVE BOX
Patient is a 62y old  Male who presents with a chief complaint of Pain on L leg (09 Mar 2018 23:39)      SUBJECTIVE / OVERNIGHT EVENTS: Leg unchanged    MEDICATIONS  (STANDING):  amLODIPine   Tablet 2.5 milliGRAM(s) Oral daily  ferrous    sulfate 325 milliGRAM(s) Oral daily  folic acid 1 milliGRAM(s) Oral daily  hydrochlorothiazide 12.5 milliGRAM(s) Oral daily  losartan 50 milliGRAM(s) Oral daily  metoprolol succinate  milliGRAM(s) Oral daily  pantoprazole    Tablet 40 milliGRAM(s) Oral before breakfast    MEDICATIONS  (PRN):  acetaminophen   Tablet. 650 milliGRAM(s) Oral every 6 hours PRN mild moderate and severe pain      Vital Signs Last 24 Hrs  T(C): 36.9 (11 Mar 2018 14:59), Max: 37.5 (10 Mar 2018 22:03)  T(F): 98.5 (11 Mar 2018 14:59), Max: 99.5 (10 Mar 2018 22:03)  HR: 70 (11 Mar 2018 14:59) (70 - 81)  BP: 118/70 (11 Mar 2018 15:22) (88/55 - 118/70)  BP(mean): --  RR: 18 (11 Mar 2018 14:59) (18 - 18)  SpO2: 98% (11 Mar 2018 14:59) (97% - 98%)  CAPILLARY BLOOD GLUCOSE        I&O's Summary      PHYSICAL EXAM:  GENERAL: NAD, well-developed  HEAD:  Atraumatic, Normocephalic  EYES: EOMI, PERRLA, conjunctiva and sclera clear  NECK: Supple, No JVD  CHEST/LUNG: Clear to auscultation bilaterally; No wheeze  HEART: Regular rate and rhythm; No murmurs, rubs, or gallops  ABDOMEN: Soft, Nontender, Nondistended; Bowel sounds present  EXTREMITIES:  2+ Peripheral Pulses, No clubbing, cyanosis, or edema  PSYCH: AAOx3  NEUROLOGY: non-focal  SKIN: LLE 1+ edema to thigh with extensive purplish patches, no erythema or warmth.     LABS:                        8.7    3.44  )-----------( 51       ( 11 Mar 2018 06:52 )             25.9   Acute Hepatitis Panel (03.10.18 @ 06:00)    Hepatitis C Virus Interpretation: Nonreactive Hepatitis C AB  S/CO Ratio                        Interpretation  < 1.0                                     Non-Reactive  1.0 - 4.9                           Weakly-Reactive  > 5.0                                 Reactive  Non-Reactive: [place] with a non-reactive HCV antibody  result is considered uninfected.  No further action is  needed unless recent infection is suspected.  In these  cases, consider repeat testing later to detect  seroconversion..  Weakly-Reactive: HCV antibody test is abnormal, HCV RNA  Qualitative test will follow.  Reactive: HCV antibody test is abnormal, HCV RNA  Qualitative test will follow.  Note: HCV antibody testing is performed on the [employer]   system.    Hepatitis C Virus S/CO Ratio: 0.25 S/CO    Hepatitis B Core IgM Antibody: Nonreactive    Hepatitis B Surface Antigen: Nonreactive    Hepatitis A IgM Antibody: Nonreactive      03-11    133<L>  |  101  |  18  ----------------------------<  75  3.8   |  20<L>  |  1.36<H>    Ca    8.0<L>      11 Mar 2018 06:52  Mg     1.6     03-10    TPro  6.8  /  Alb  2.0<L>  /  TBili  3.7<H>  /  DBili  x   /  AST  72<H>  /  ALT  22  /  AlkPhos  59  03-11    PT/INR - ( 10 Mar 2018 06:00 )   PT: 20.2 SEC;   INR: 1.80          PTT - ( 10 Mar 2018 06:00 )  PTT:35.5 SEC    HIV-1/2 Ag/Ab Combo (03.11.18 @ 06:52)    HIV-1 Ag: NON-REACTIVE If patient is suspected to be at risk and/or in the  diagnostic window period, then consider subsequent HIV  retesting with new sample.    HIV-1/2 Ab: NON-REACTIVE If patient is suspected to be at risk and/or in the  diagnostic window period, then consider subsequent HIV  retesting with new sample.          RADIOLOGY & ADDITIONAL TESTS:    < from: US Duplex Venous Lower Ext Complete, Bilateral (03.11.18 @ 11:04) >    EXAM:  US DPLX LWR EXT VEINS COMPL BI        PROCEDURE DATE:  Mar 11 2018         INTERPRETATION:  CLINICAL INFORMATION: Lower extremity swelling.    COMPARISON: None available.    TECHNIQUE: Duplex sonography of the BILATERAL LOWER extremities with   color and spectral Doppler, with and without compression.      FINDINGS:    There is normal compressibility of the bilateral common femoral, femoral   and popliteal veins. No calf vein thrombosis is detected.    Doppler examination shows normal spontaneous and phasic flow.    IMPRESSION:     No evidence of bilateral lower extremity deep venous thrombosis.                      NICOLETTE BEAL M.D., ATTENDING RADIOLOGIST  This document has been electronically signed. Mar 11 2018  1:25PM                 < end of copied text >      Care Discussed with Consultants/Other Providers: Derm

## 2018-03-11 NOTE — PROGRESS NOTE ADULT - ASSESSMENT
62M hx of Gout, Hypertension, Cirrhosis, Sciatica with discolored and swollen left leg, with progressive difficulty in walking.

## 2018-03-11 NOTE — PHYSICAL THERAPY INITIAL EVALUATION ADULT - CRITERIA FOR SKILLED THERAPEUTIC INTERVENTIONS
impairments found/anticipated equipment needs at discharge/rehab potential/predicted duration of therapy intervention

## 2018-03-12 ENCOUNTER — TRANSCRIPTION ENCOUNTER (OUTPATIENT)
Age: 63
End: 2018-03-12

## 2018-03-12 DIAGNOSIS — T14.8XXA OTHER INJURY OF UNSPECIFIED BODY REGION, INITIAL ENCOUNTER: ICD-10-CM

## 2018-03-12 LAB
ALBUMIN SERPL ELPH-MCNC: 2.2 G/DL — LOW (ref 3.3–5)
ALP SERPL-CCNC: 60 U/L — SIGNIFICANT CHANGE UP (ref 40–120)
ALT FLD-CCNC: 18 U/L — SIGNIFICANT CHANGE UP (ref 4–41)
AST SERPL-CCNC: 70 U/L — HIGH (ref 4–40)
BASOPHILS # BLD AUTO: 0.02 K/UL — SIGNIFICANT CHANGE UP (ref 0–0.2)
BASOPHILS NFR BLD AUTO: 0.5 % — SIGNIFICANT CHANGE UP (ref 0–2)
BILIRUB SERPL-MCNC: 3.7 MG/DL — HIGH (ref 0.2–1.2)
BUN SERPL-MCNC: 18 MG/DL — SIGNIFICANT CHANGE UP (ref 7–23)
CALCIUM SERPL-MCNC: 7.9 MG/DL — LOW (ref 8.4–10.5)
CHLORIDE SERPL-SCNC: 101 MMOL/L — SIGNIFICANT CHANGE UP (ref 98–107)
CO2 SERPL-SCNC: 19 MMOL/L — LOW (ref 22–31)
CREAT SERPL-MCNC: 1.35 MG/DL — HIGH (ref 0.5–1.3)
CRP SERPL-MCNC: 10.7 MG/L — HIGH
EOSINOPHIL # BLD AUTO: 0.11 K/UL — SIGNIFICANT CHANGE UP (ref 0–0.5)
EOSINOPHIL NFR BLD AUTO: 2.9 % — SIGNIFICANT CHANGE UP (ref 0–6)
ERYTHROCYTE [SEDIMENTATION RATE] IN BLOOD: 45 MM/HR — HIGH (ref 1–15)
GLUCOSE SERPL-MCNC: 78 MG/DL — SIGNIFICANT CHANGE UP (ref 70–99)
HCT VFR BLD CALC: 25.5 % — LOW (ref 39–50)
HGB BLD-MCNC: 8.9 G/DL — LOW (ref 13–17)
IMM GRANULOCYTES # BLD AUTO: 0.02 # — SIGNIFICANT CHANGE UP
IMM GRANULOCYTES NFR BLD AUTO: 0.5 % — SIGNIFICANT CHANGE UP (ref 0–1.5)
LYMPHOCYTES # BLD AUTO: 0.63 K/UL — LOW (ref 1–3.3)
LYMPHOCYTES # BLD AUTO: 16.9 % — SIGNIFICANT CHANGE UP (ref 13–44)
MCHC RBC-ENTMCNC: 34.9 % — SIGNIFICANT CHANGE UP (ref 32–36)
MCHC RBC-ENTMCNC: 36.6 PG — HIGH (ref 27–34)
MCV RBC AUTO: 104.9 FL — HIGH (ref 80–100)
MONOCYTES # BLD AUTO: 0.62 K/UL — SIGNIFICANT CHANGE UP (ref 0–0.9)
MONOCYTES NFR BLD AUTO: 16.6 % — HIGH (ref 2–14)
NEUTROPHILS # BLD AUTO: 2.33 K/UL — SIGNIFICANT CHANGE UP (ref 1.8–7.4)
NEUTROPHILS NFR BLD AUTO: 62.6 % — SIGNIFICANT CHANGE UP (ref 43–77)
NRBC # FLD: 0 — SIGNIFICANT CHANGE UP
PLATELET # BLD AUTO: 60 K/UL — LOW (ref 150–400)
PMV BLD: 10.5 FL — SIGNIFICANT CHANGE UP (ref 7–13)
POTASSIUM SERPL-MCNC: 4.1 MMOL/L — SIGNIFICANT CHANGE UP (ref 3.5–5.3)
POTASSIUM SERPL-SCNC: 4.1 MMOL/L — SIGNIFICANT CHANGE UP (ref 3.5–5.3)
PROT SERPL-MCNC: 7.1 G/DL — SIGNIFICANT CHANGE UP (ref 6–8.3)
RBC # BLD: 2.43 M/UL — LOW (ref 4.2–5.8)
RBC # FLD: 17.1 % — HIGH (ref 10.3–14.5)
SODIUM SERPL-SCNC: 133 MMOL/L — LOW (ref 135–145)
WBC # BLD: 3.73 K/UL — LOW (ref 3.8–10.5)
WBC # FLD AUTO: 3.73 K/UL — LOW (ref 3.8–10.5)

## 2018-03-12 PROCEDURE — 99233 SBSQ HOSP IP/OBS HIGH 50: CPT

## 2018-03-12 PROCEDURE — 99223 1ST HOSP IP/OBS HIGH 75: CPT | Mod: GC

## 2018-03-12 RX ADMIN — AMLODIPINE BESYLATE 2.5 MILLIGRAM(S): 2.5 TABLET ORAL at 05:50

## 2018-03-12 RX ADMIN — Medication 1 MILLIGRAM(S): at 11:58

## 2018-03-12 RX ADMIN — Medication 650 MILLIGRAM(S): at 07:20

## 2018-03-12 RX ADMIN — Medication 650 MILLIGRAM(S): at 21:22

## 2018-03-12 RX ADMIN — Medication 325 MILLIGRAM(S): at 11:58

## 2018-03-12 RX ADMIN — Medication 650 MILLIGRAM(S): at 22:20

## 2018-03-12 RX ADMIN — PANTOPRAZOLE SODIUM 40 MILLIGRAM(S): 20 TABLET, DELAYED RELEASE ORAL at 06:38

## 2018-03-12 RX ADMIN — Medication 12.5 MILLIGRAM(S): at 05:50

## 2018-03-12 RX ADMIN — Medication 650 MILLIGRAM(S): at 06:41

## 2018-03-12 NOTE — DISCHARGE NOTE ADULT - PROVIDER TOKENS
TOKEN:'[id number]',FREE:[LAST:[Ortho clinic [facility]],PHONE:[phone]],FAX:[(   [phone]    -],ADDRESS:[[phone] [place] [address]   3rd floor oncology building]]

## 2018-03-12 NOTE — CONSULT NOTE ADULT - PROBLEM SELECTOR RECOMMENDATION 9
Of L leg as above  Appears it may still be slowly expanding based on recently drawn borders, red color at periphery, and downtrending Hgb  Recommend CT of leg for confirmation, since dermis/epidermis uninvolved, skin biopsy unlikely to be of benefit at this time, however will monitor  Appears to be associated with significant knee effusion, suggest rheum eval for consideration of diagnostic or therapeutic tap  -Trend coags, Hgb daily and address as warranted per primary team  -will follow

## 2018-03-12 NOTE — CONSULT NOTE ADULT - SUBJECTIVE AND OBJECTIVE BOX
HPI:  62M hx of Gout, Hypertension, EtOH Cirrhosis, Sciatica presents to [facility] ED c/o LLE pain since weds. Also has noted purple discoloration and swelling. Has bleeding esophageal varices 2/2 cirrhosis previously but no other bleeding problems. This has never happened before. He was in his usual state of health prior to his leg swelling and pain. He denies trauma to the leg and he does not work presently. However because of the changes he has had progressive difficulty walking. No other associated complaints. He has been ruled out for DVT. He was briefly treated with clinda for cellulitis but this was d/c'ed. No fevers      PAST MEDICAL & SURGICAL HISTORY:  Esophageal varices: s/p bleed July 2016, s[facility]p banding[facility], as of EGD 1/22/18 no further banding or EGD needed  HTN (hypertension)  Cirrhosis  Sciatica  Gout  History of ventral hernia repair: 11/22/16      REVIEW OF SYSTEMS      General: no fevers/chills, no lethary	    Skin/Breast: see HPI  	  Ophthalmologic: no eye pain or change in vision  	  ENMT: no dysphagia or change in hearing    Respiratory and Thorax: no SOB or cough  	  Cardiovascular: no palpitations or chest pain    Gastrointestinal: no abdomenal pain or blood in stool     Genitourinary: no dysuria or frequency    Musculoskeletal: no joint pains or weakness	    Neurological:no weakness, numbness , or tingling    MEDICATIONS  (STANDING):  ferrous    sulfate 325 milliGRAM(s) Oral daily  folic acid 1 milliGRAM(s) Oral daily  metoprolol succinate  milliGRAM(s) Oral daily  pantoprazole    Tablet 40 milliGRAM(s) Oral before breakfast    MEDICATIONS  (PRN):  acetaminophen   Tablet. 650 milliGRAM(s) Oral every 6 hours PRN mild moderate and severe pain      Allergies    No Known Allergies    SOCIAL HISTORY:    FAMILY HISTORY:  No significant family history      Vital Signs Last 24 Hrs  T(C): 36.8 (12 Mar 2018 15:10), Max: 37.8 (11 Mar 2018 21:39)  T(F): 98.2 (12 Mar 2018 15:10), Max: 100 (11 Mar 2018 21:39)  HR: 70 (12 Mar 2018 15:10) (70 - 75)  BP: 124/87 (12 Mar 2018 15:10) (89/78 - 124/87)  BP(mean): --  RR: 18 (12 Mar 2018 15:10) (18 - 18)  SpO2: 100% (12 Mar 2018 15:10) (100% - 100%)    PHYSICAL EXAM:     The patient was alert and oriented X 3, well nourished, and in no  apparent distress.  OP showed no ulcerations  There was no visible lymphadenopathy.  Conjunctiva were non injected  There was no clubbing or edema of extremities.  The scalp, hair, face, eyebrows, lips, OP, neck, chest, back,   extremities X 4, nails were examined.  There was no hyperhidrosis or bromhidrosis.    Of note on skin exam:   On R thigh and shin is a geometric red, and purple patch associated with significant swelling, knee effusion, tenderness to palpation    LABS:                        8.9    3.73  )-----------( 60       ( 12 Mar 2018 05:35 )             25.5     03-12    133<L>  |  101  |  18  ----------------------------<  78  4.1   |  19<L>  |  1.35<H>    Ca    7.9<L>      12 Mar 2018 05:35    TPro  7.1  /  Alb  2.2<L>  /  TBili  3.7<H>  /  DBili  x   /  AST  70<H>  /  ALT  18  /  AlkPhos  60  03-12          RADIOLOGY & ADDITIONAL STUDIES: DVT Neg HPI:  62M hx of Gout, Hypertension, EtOH Cirrhosis, Sciatica presents to [facility] ED c/o LLE pain since weds. Also has noted purple discoloration and swelling. Has bleeding esophageal varices 2/2 cirrhosis previously but no other bleeding problems. This has never happened before. He was in his usual state of health prior to his leg swelling and pain. He denies trauma to the leg and he does not work presently. However because of the changes he has had progressive difficulty walking. No other associated complaints. He has been ruled out for DVT. He was briefly treated with clinda for cellulitis but this was d/c'ed. No fevers      PAST MEDICAL & SURGICAL HISTORY:  Esophageal varices: s/p bleed July 2016, s[facility]p banding[facility], as of EGD 1/22/18 no further banding or EGD needed  HTN (hypertension)  Cirrhosis  Sciatica  Gout  History of ventral hernia repair: 11/22/16      REVIEW OF SYSTEMS      General: no fevers/chills, no lethary	    Skin/Breast: see HPI  	  Ophthalmologic: no eye pain or change in vision  	  ENMT: no dysphagia or change in hearing    Respiratory and Thorax: no SOB or cough  	  Cardiovascular: no palpitations or chest pain    Gastrointestinal: no abdomenal pain or blood in stool     Genitourinary: no dysuria or frequency    Musculoskeletal: no joint pains or weakness	    Neurological:no weakness, numbness , or tingling    MEDICATIONS  (STANDING):  ferrous    sulfate 325 milliGRAM(s) Oral daily  folic acid 1 milliGRAM(s) Oral daily  metoprolol succinate  milliGRAM(s) Oral daily  pantoprazole    Tablet 40 milliGRAM(s) Oral before breakfast    MEDICATIONS  (PRN):  acetaminophen   Tablet. 650 milliGRAM(s) Oral every 6 hours PRN mild moderate and severe pain      Allergies    No Known Allergies    SOCIAL HISTORY:    FAMILY HISTORY:  No significant family history      Vital Signs Last 24 Hrs  T(C): 36.8 (12 Mar 2018 15:10), Max: 37.8 (11 Mar 2018 21:39)  T(F): 98.2 (12 Mar 2018 15:10), Max: 100 (11 Mar 2018 21:39)  HR: 70 (12 Mar 2018 15:10) (70 - 75)  BP: 124/87 (12 Mar 2018 15:10) (89/78 - 124/87)  BP(mean): --  RR: 18 (12 Mar 2018 15:10) (18 - 18)  SpO2: 100% (12 Mar 2018 15:10) (100% - 100%)    PHYSICAL EXAM:     The patient was alert and oriented X 3, well nourished, and in no  apparent distress.  OP showed no ulcerations  There was no visible lymphadenopathy.  Conjunctiva were non injected  There was no clubbing or edema of extremities.  The scalp, hair, face, eyebrows, lips, OP, neck, chest, back,   extremities X 4, nails were examined.  There was no hyperhidrosis or bromhidrosis.    Of note on skin exam:   On L thigh and shin in dependent areas is a geometric red, and purple patch associated with significant swelling, along with knee effusion, tenderness to palpation    LABS:                        8.9    3.73  )-----------( 60       ( 12 Mar 2018 05:35 )             25.5     03-12    133<L>  |  101  |  18  ----------------------------<  78  4.1   |  19<L>  |  1.35<H>    Ca    7.9<L>      12 Mar 2018 05:35    TPro  7.1  /  Alb  2.2<L>  /  TBili  3.7<H>  /  DBili  x   /  AST  70<H>  /  ALT  18  /  AlkPhos  60  03-12          RADIOLOGY & ADDITIONAL STUDIES: DVT Neg

## 2018-03-12 NOTE — DISCHARGE NOTE ADULT - HOSPITAL COURSE
62M hx of Gout, Hypertension, Cirrhosis, Sciatica being admitted for suspicion of cellulitis with progressive difficulty in walking. HD stable.     Cellulitis of left lower extremity  - B/L US-neg   -f/u BCx,   - Podiatry consulted: - No pod surg intervention at this time  - X-ray of left knee-Prepatellar and superficial infrapatellar soft tissue swelling.  -s/p Clindamycin x 1. Rash does not appear to be cellulitis, likely vasulitis, monitor off antibiotics, consult dermatology  -Dermatology consulted-suspected spontaneous left knee hematoma in the setting of hypercoagulapthy and thrombocytopenia, rec. CT Left knee, Rheum c/s   -CT of left knee to evaluate hematoma [ ]    Alcoholic cirrhosis of liver without ascites  - Hx of cirrhosis 2/2 alcoholism. Says he had recent EGD in february but unsure of results. Will continue to monitor clinical status, no asterixis on exam or encephalopathy   - MELD Score = 23   - Patient with distended abdomen would check US Abdomen to evaluate for ascites   - Check HCV/HBV/HAV  - Trend Coagulation daily with Bilirubin, Na+, and Creatinine.     Thrombocytopenia  - Likely sequelae of liver disease  - Would monitor platelets daily and transfuse if < 10K.         Anemia  - Patient with anemia in the setting of cirrhosis. Likely anemia of chronic disease. Check iron studies, c/w ferrous sulfate.         Coagulopathy  - Likely due to cirrhosis. Would trend daily.         Essential hypertension  - c/w home Losartan, Metoprolol, Amlodipine, and Hctz.      Prophylactic measure  - IMPROVE VTE Individual Risk Assessment, Score = 2, and patient is cirrhotic, however patient has PLT of 38. Therefore for now would c/w SCDs for DVT ppx 62M hx of Gout, Hypertension, Cirrhosis, Sciatica being admitted for suspicion of cellulitis with progressive difficulty in walking. HD stable.       Hospital Course     Cellulitis of left lower extremity  - B/L US-neg   -fBCx: NTD  - Podiatry consulted: - No pod surg intervention at this time  - X-ray of left knee-Prepatellar and superficial infrapatellar soft tissue swelling.  -s/p Clindamycin x 1. Rash does not appear to be cellulitis, likely vasulitis, monitor off antibiotics, consult dermatology  -Dermatology consulted-suspected spontaneous left knee hematoma in the setting of hypercoagulapthy and thrombocytopenia, rec. CT Left knee, Rheum c/s   -CT of left knee to evaluate hematoma: Generalized calf subcutaneous swelling and stranding compatible with edema, hemorrhagic infiltration, or cellulitic change.  - Ortho : low concern for septic knee, f/u prepatellar bursa aspiration labs  -wbat  -pain control  -Gram stain negative       Alcoholic cirrhosis of liver without ascites  - Hx of cirrhosis 2/2 alcoholism. Says he had recent EGD in february but unsure of results. Will continue to monitor clinical status, no asterixis on exam or encephalopathy   - MELD Score = 23   - Patient with distended abdomen would check US Abdomen to evaluate for ascites   - Check HCV/HBV/HAV  - Trend Coagulation daily with Bilirubin, Na+, and Creatinine.     Thrombocytopenia  - Likely sequelae of liver disease  - Would monitor platelets daily and transfuse if < 10K.         Anemia  - Patient with anemia in the setting of cirrhosis. Likely anemia of chronic disease. Check iron studies, c/w ferrous sulfate.         Coagulopathy  - Likely due to cirrhosis. Would trend daily.         Essential hypertension  - c/w home Losartan, Metoprolol, Amlodipine, and Hctz.      Prophylactic measure  - IMPROVE VTE Individual Risk Assessment, Score = 2, and patient is cirrhotic, however patient has PLT of 38. Therefore for now would c/w SCDs for DVT ppx 62M hx of Gout, Hypertension, Cirrhosis, Sciatica being admitted for suspicion of cellulitis with progressive difficulty in walking. HD stable.       Hospital Course     Cellulitis of left lower extremity  - B/L US-neg   -BCx: NTD  - Podiatry consulted: - No pod surg intervention at this time  - X-ray of left knee-Prepatellar and superficial infrapatellar soft tissue swelling.  -s/p Clindamycin x 1. Rash does not appear to be cellulitis, likely vasulitis, monitor off antibiotics, consult dermatology  -Dermatology consulted-suspected spontaneous left knee hematoma in the setting of hypercoagulapthy and thrombocytopenia, rec. CT Left knee, Rheum c/s   -CT of left knee to evaluate hematoma: Generalized calf subcutaneous swelling and stranding compatible with edema, hemorrhagic infiltration, or cellulitic change.  - Ortho : low concern for septic knee, f/u prepatellar bursa aspiration labs  -wbat  -pain control  -Gram stain negative       Alcoholic cirrhosis of liver without ascites  - Hx of cirrhosis 2/2 alcoholism. Says he had recent EGD in february but unsure of results. Will continue to monitor clinical status, no asterixis on exam or encephalopathy   - MELD Score = 23   - Patient with distended abdomen would check US Abdomen to evaluate for ascites   - HCV/HBV/HAV ( Negative )  - Trended Coagulation daily with Bilirubin, Na+, and Creatinine.     Thrombocytopenia  - Likely sequelae of liver disease  - Would monitor platelets daily and transfuse if < 10K.         Anemia  - Patient with anemia in the setting of cirrhosis. Likely anemia of chronic disease. Checked iron studies,   -c/w ferrous sulfate.       Coagulopathy  - Likely due to cirrhosis. Would trend daily.         Essential hypertension  - c/w home Losartan, Metoprolol, Amlodipine, and Hctz.      Prophylactic measure  - IMPROVE VTE Individual Risk Assessment, Score = 2, and patient is cirrhotic, however patient has PLT of 38. Therefore for now would c/w SCDs for DVT ppx 62M hx of Gout, Hypertension, Cirrhosis, Sciatica being admitted for suspicion of cellulitis with progressive difficulty in walking. HD stable.       Hospital Course     Cellulitis of left lower extremity  - B/L US-neg   -BCx: NTD  - Podiatry consulted: - No pod surg intervention at this time  - X-ray of left knee-Prepatellar and superficial infrapatellar soft tissue swelling.  -s/p Clindamycin x 1. Rash does not appear to be cellulitis, likely vasulitis, monitor off antibiotics, consult dermatology  -Dermatology consulted-suspected spontaneous left knee hematoma in the setting of hypercoagulapthy and thrombocytopenia, rec. CT Left knee, Rheum c/s   -CT of left knee to evaluate hematoma: Generalized calf subcutaneous swelling and stranding compatible with edema, hemorrhagic infiltration, or cellulitic change.  - Ortho : low concern for septic knee, f/u prepatellar bursa aspiration labs  -wbat  -pain control  -Gram stain negative       Alcoholic cirrhosis of liver without ascites  - Hx of cirrhosis 2/2 alcoholism. Says he had recent EGD in february but unsure of results. Will continue to monitor clinical status, no asterixis on exam or encephalopathy   - MELD Score = 23   - Patient with distended abdomen would check US Abdomen to evaluate for ascites   - HCV/HBV/HAV ( Negative )  - Trended Coagulation daily with Bilirubin, Na+, and Creatinine.     Thrombocytopenia  - Likely sequelae of liver disease  - Would monitor platelets daily and transfuse if < 10K.         Anemia  - Patient with anemia in the setting of cirrhosis. Likely anemia of chronic disease. Checked iron studies,   -c/w ferrous sulfate.       Coagulopathy  - Likely due to cirrhosis. Would trend daily.         Essential hypertension  Blood pressures were low      Prophylactic measure  - IMPROVE VTE Individual Risk Assessment, Score = 2, and patient is cirrhotic, however patient has PLT of 38. Therefore for now would c/w SCDs for DVT ppx 62M hx of Gout, Hypertension, Cirrhosis, Sciatica being admitted for suspicion of cellulitis with progressive difficulty in walking. HD stable.       Hospital Course     Leg pain and swelling due to hemorrhagic bursitis of left knee  - B/L US-neg for DVT  Knee bursa fluid aspirated by otrhopedics on 3/13/18, gram stain and cultures negative for infection. Ortho f/u recommended.    - X-ray of left knee-Prepatellar and superficial infrapatellar soft tissue swelling.  -Dermatology consulted-suspected spontaneous left knee hematoma in the setting of hypercoagulapthy and thrombocytopenia, rec. CT Left knee,   -CT of left knee to evaluate hematoma: Generalized calf subcutaneous swelling and stranding compatible with edema, hemorrhagic infiltration, or cellulitic change.  Pty eval: ambulate with cane      Alcoholic cirrhosis of liver without ascites  - Hx of cirrhosis 2/2 alcoholism. EGD with GI doctor at [facility] January 2018--no varices previously banded, no further need for EGD  - MELD Score = 23   - HCV/HBV/HAV ( Negative )      Thrombocytopenia and elevated INR  - Due to cirrhosis of liver, stable      Essential hypertension  Blood pressures were low, all antihypertensives except for metoprolol discontinued

## 2018-03-12 NOTE — DISCHARGE NOTE ADULT - PLAN OF CARE
S/P drain Follow up with Ortho Clinic in 1-2 weeks. Call for an appointment [phone] Continue taking your Iron supplement C/W Lopressor Follow up with Ortho clinic [address] [place] [address] oncology [facility] [phone] [place] [address] [phone]  Wednesday March 28, 8:30 AM bring ID and insurance card.

## 2018-03-12 NOTE — PROGRESS NOTE ADULT - PROBLEM SELECTOR PLAN 1
No DVT on LE dopplers  Rash does not look like cellulitis, more consistent with a  vasculitis. No leukocytosis.  Observe off ABx  Hepatitis serologies negative.  appreciate PT eval, no needs  Derm eval  ortho eval?  anticipate d/c today   d/c time 45 min

## 2018-03-12 NOTE — DISCHARGE NOTE ADULT - CARE PROVIDER_API CALL
Hayden Chaparro (MBBS), Medicine  [address] [place] [address]  Phone: [phone]  Fax: [phone]    [facility],   [address] [place] [address]   3rd floor oncology building  Phone: [phone]  Fax: (   )    -

## 2018-03-12 NOTE — CONSULT NOTE ADULT - ATTENDING COMMENTS
Addendum: 3/13/19 1pm    Patient seen and examined. Agree with above. In the context of coagulopathy, unilaterality, and knee effusion, favor traumatic purpura leading to underlying hematoma. Exam not suggestive of a vasculitis. Skin biopsy offered to further evaluate however patient deferred. Will monitor clinically. Agree with rheum consult given L knee effusion. Will continue to follow.

## 2018-03-12 NOTE — DISCHARGE NOTE ADULT - PATIENT PORTAL LINK FT
You can access the [facility] Patient Portal, offered by [facility], by registering with the following website: [email or web address]

## 2018-03-12 NOTE — DISCHARGE NOTE ADULT - MEDICATION SUMMARY - MEDICATIONS TO STOP TAKING
I will STOP taking the medications listed below when I get home from the hospital:    losartan-hydroCHLOROthiazide 50mg-12.5mg oral tablet  -- 1 tab(s) by mouth once a day    amLODIPine 2.5 mg oral tablet  -- 1 tab(s) by mouth once a day    ketorolac 10 mg oral tablet  -- 1 tab(s) by mouth 4 times a day, As Needed -for moderate pain MDD:4  -- It is very important that you take or use this exactly as directed.  Do not skip doses or discontinue unless directed by your doctor.  May cause drowsiness or dizziness.  Obtain medical advice before taking any non-prescription drugs as some may affect the action of this medication.  Take with food or milk.

## 2018-03-12 NOTE — PROGRESS NOTE ADULT - SUBJECTIVE AND OBJECTIVE BOX
Patient is a 62y old  Male who presents with a chief complaint of Pain on L leg (09 Mar 2018 23:39)      SUBJECTIVE / OVERNIGHT EVENTS: No change in leg    MEDICATIONS  (STANDING):  ferrous    sulfate 325 milliGRAM(s) Oral daily  folic acid 1 milliGRAM(s) Oral daily  metoprolol succinate  milliGRAM(s) Oral daily  pantoprazole    Tablet 40 milliGRAM(s) Oral before breakfast    MEDICATIONS  (PRN):  acetaminophen   Tablet. 650 milliGRAM(s) Oral every 6 hours PRN mild moderate and severe pain      Vital Signs Last 24 Hrs  T(C): 36.4 (12 Mar 2018 11:48), Max: 37.8 (11 Mar 2018 21:39)  T(F): 97.6 (12 Mar 2018 11:48), Max: 100 (11 Mar 2018 21:39)  HR: 71 (12 Mar 2018 11:48) (70 - 75)  BP: 97/68 (12 Mar 2018 11:56) (89/78 - 118/70)  BP(mean): --  RR: 18 (12 Mar 2018 11:48) (18 - 18)  SpO2: 100% (12 Mar 2018 11:48) (98% - 100%)  CAPILLARY BLOOD GLUCOSE        I&O's Summary      PHYSICAL EXAM:  GENERAL: NAD, well-developed  HEAD:  Atraumatic, Normocephalic  EYES: EOMI, PERRLA, conjunctiva and sclera clear  NECK: Supple, No JVD  CHEST/LUNG: Clear to auscultation bilaterally; No wheeze  HEART: Regular rate and rhythm; No murmurs, rubs, or gallops  ABDOMEN: Soft, Nontender, Nondistended; Bowel sounds present  EXTREMITIES:  2+ Peripheral Pulses, No clubbing, cyanosis, or edema  PSYCH: AAOx3  NEUROLOGY: non-focal  SKIN: LLE 1+ edema to thigh with extensive purplish patches, no erythema or warmth.       LABS:                        8.9    3.73  )-----------( 60       ( 12 Mar 2018 05:35 )             25.5     03-12    133<L>  |  101  |  18  ----------------------------<  78  4.1   |  19<L>  |  1.35<H>    Ca    7.9<L>      12 Mar 2018 05:35    TPro  7.1  /  Alb  2.2<L>  /  TBili  3.7<H>  /  DBili  x   /  AST  70<H>  /  ALT  18  /  AlkPhos  60  03-12      RADIOLOGY & ADDITIONAL TESTS:    Imaging Personally Reviewed:  < from: Xray Knee 3 Views, Left (03.11.18 @ 17:43) >    EXAM:  RAD KNEE 3 VIEWS LEFT        PROCEDURE DATE:  Mar 11 2018     INTERPRETATION:  CLINICAL INDICATION: left knee pain and swelling    EXAM:  Frontal, oblique, and lateral left knee from 3/11/2018 at 1743. No   similar prior studies available for comparison.    IMPRESSION:   Prepatellar and superficial infrapatellar soft tissue swelling. No   tracking soft tissue gas collections, radiopaque foreign bodies, or gross   radiographic evidence for osteomyelitis.    No underlying fracture, dislocation, or joint effusion.    Preserved joint spaces with smooth and intact articular surfaces. No   joint margin erosions or intra-articular or periarticular calcifications.    Unremarkable quadriceps and patellar tendon shadows.     No destructiveosseous lesions or periosteal reaction.    Scant faint vascular calcifications.      LOBITO REECE M.D., ATTENDING RADIOLOGIST  This document has been electronically signed. Mar 12 2018  8:58AM           < end of copied text >        Care Discussed with Consultants/Other Providers:

## 2018-03-12 NOTE — DISCHARGE NOTE ADULT - CARE PLAN
Principal Discharge DX:	Hemorrhagic prepatellar bursitis, left  Goal:	S/P drain  Assessment and plan of treatment:	Follow up with Ortho Clinic in 1-2 weeks. Call for an appointment [phone]  Secondary Diagnosis:	Thrombocytopenia  Secondary Diagnosis:	Anemia  Assessment and plan of treatment:	Continue taking your Iron supplement  Secondary Diagnosis:	HTN (hypertension)  Assessment and plan of treatment:	C/W Lopressor Principal Discharge DX:	Hemorrhagic prepatellar bursitis, left  Goal:	S/P drain  Assessment and plan of treatment:	Follow up with Ortho clinic [address] [place] [address] oncology [facility] [phone] [place] [address] [phone]  Wednesday March 28, 8:30 AM bring ID and insurance card.  Secondary Diagnosis:	Thrombocytopenia  Secondary Diagnosis:	Anemia  Assessment and plan of treatment:	Continue taking your Iron supplement  Secondary Diagnosis:	HTN (hypertension)  Assessment and plan of treatment:	C/W Lopressor

## 2018-03-12 NOTE — DISCHARGE NOTE ADULT - MEDICATION SUMMARY - MEDICATIONS TO TAKE
I will START or STAY ON the medications listed below when I get home from the hospital:    Metoprolol Succinate  mg oral tablet, extended release  -- 1 tab(s) by mouth once a day  -- Indication: For HTN    ferrous sulfate 325 mg (65 mg elemental iron) oral tablet  -- 1 tab(s) by mouth once a day  -- Check with your doctor before becoming pregnant.  Do not chew, break, or crush.  May discolor urine or feces.    -- Indication: For Anemia     pantoprazole 40 mg oral delayed release tablet  -- 1 tab(s) by mouth once a day  -- Indication: For GI PPX    folic acid 1 mg oral tablet  -- 1 tab(s) by mouth once a day  -- Indication: For Supplement

## 2018-03-12 NOTE — CONSULT NOTE ADULT - ASSESSMENT
61yo M w/ cirrhosis and h/o bleeding esophageal varices present with large ecchymosis of L leg, likely spontanoues hematoma secondary to significant coagulopathy and thrombocytopenia 63yo M w/ cirrhosis and h/o bleeding esophageal varices present with large ecchymosis of L leg, likely hematoma secondary to significant coagulopathy and thrombocytopenia.

## 2018-03-13 LAB
ANISOCYTOSIS BLD QL: SIGNIFICANT CHANGE UP
APTT BLD: 36.9 SEC — SIGNIFICANT CHANGE UP (ref 27.5–37.4)
BASOPHILS # BLD AUTO: 0.05 K/UL — SIGNIFICANT CHANGE UP (ref 0–0.2)
BASOPHILS NFR BLD AUTO: 1.1 % — SIGNIFICANT CHANGE UP (ref 0–2)
BASOPHILS NFR SPEC: 1.8 % — SIGNIFICANT CHANGE UP (ref 0–2)
BODY FLUID TYPE: SIGNIFICANT CHANGE UP
BUN SERPL-MCNC: 15 MG/DL — SIGNIFICANT CHANGE UP (ref 7–23)
CALCIUM SERPL-MCNC: 8.3 MG/DL — LOW (ref 8.4–10.5)
CHLORIDE SERPL-SCNC: 98 MMOL/L — SIGNIFICANT CHANGE UP (ref 98–107)
CLARITY SPEC: SIGNIFICANT CHANGE UP
CO2 SERPL-SCNC: 22 MMOL/L — SIGNIFICANT CHANGE UP (ref 22–31)
COLOR FLD: SIGNIFICANT CHANGE UP
CREAT SERPL-MCNC: 1.35 MG/DL — HIGH (ref 0.5–1.3)
CRYSTALS FLD MICRO: SIGNIFICANT CHANGE UP
EOSINOPHIL # BLD AUTO: 0.08 K/UL — SIGNIFICANT CHANGE UP (ref 0–0.5)
EOSINOPHIL # FLD: 2 % — SIGNIFICANT CHANGE UP
EOSINOPHIL NFR BLD AUTO: 1.8 % — SIGNIFICANT CHANGE UP (ref 0–6)
EOSINOPHIL NFR FLD: 1.7 % — SIGNIFICANT CHANGE UP (ref 0–6)
GIANT PLATELETS BLD QL SMEAR: PRESENT — SIGNIFICANT CHANGE UP
GLUCOSE SERPL-MCNC: 97 MG/DL — SIGNIFICANT CHANGE UP (ref 70–99)
GRAM STN FLD: SIGNIFICANT CHANGE UP
HCT VFR BLD CALC: 28.8 % — LOW (ref 39–50)
HGB BLD-MCNC: 9.8 G/DL — LOW (ref 13–17)
HYPOCHROMIA BLD QL: SLIGHT — SIGNIFICANT CHANGE UP
IMM GRANULOCYTES # BLD AUTO: 0.04 # — SIGNIFICANT CHANGE UP
IMM GRANULOCYTES NFR BLD AUTO: 0.9 % — SIGNIFICANT CHANGE UP (ref 0–1.5)
INR BLD: 1.72 — HIGH (ref 0.88–1.17)
LYMPHOCYTES # BLD AUTO: 0.75 K/UL — LOW (ref 1–3.3)
LYMPHOCYTES # BLD AUTO: 16.7 % — SIGNIFICANT CHANGE UP (ref 13–44)
LYMPHOCYTES NFR FLD: 34 % — SIGNIFICANT CHANGE UP
LYMPHOCYTES NFR SPEC AUTO: 7 % — LOW (ref 13–44)
MACROCYTES BLD QL: SIGNIFICANT CHANGE UP
MACROPHAGES # FLD: 6 % — SIGNIFICANT CHANGE UP
MCHC RBC-ENTMCNC: 34 % — SIGNIFICANT CHANGE UP (ref 32–36)
MCHC RBC-ENTMCNC: 37.1 PG — HIGH (ref 27–34)
MCV RBC AUTO: 109.1 FL — HIGH (ref 80–100)
MONOCYTES # BLD AUTO: 0.79 K/UL — SIGNIFICANT CHANGE UP (ref 0–0.9)
MONOCYTES # FLD: 12 % — SIGNIFICANT CHANGE UP
MONOCYTES NFR BLD AUTO: 17.6 % — HIGH (ref 2–14)
MONOCYTES NFR BLD: 12.3 % — HIGH (ref 2–9)
NEUTROPHIL AB SER-ACNC: 77.2 % — HIGH (ref 43–77)
NEUTROPHILS # BLD AUTO: 2.77 K/UL — SIGNIFICANT CHANGE UP (ref 1.8–7.4)
NEUTROPHILS NFR BLD AUTO: 61.9 % — SIGNIFICANT CHANGE UP (ref 43–77)
NEUTS SEG NFR FLD MANUAL: 46 % — SIGNIFICANT CHANGE UP
NRBC # FLD: 0 — SIGNIFICANT CHANGE UP
PLATELET # BLD AUTO: 75 K/UL — LOW (ref 150–400)
PLATELET COUNT - ESTIMATE: SIGNIFICANT CHANGE UP
PMV BLD: 10.3 FL — SIGNIFICANT CHANGE UP (ref 7–13)
POLYCHROMASIA BLD QL SMEAR: SLIGHT — SIGNIFICANT CHANGE UP
POTASSIUM SERPL-MCNC: 4.4 MMOL/L — SIGNIFICANT CHANGE UP (ref 3.5–5.3)
POTASSIUM SERPL-SCNC: 4.4 MMOL/L — SIGNIFICANT CHANGE UP (ref 3.5–5.3)
PROTHROM AB SERPL-ACNC: 19.3 SEC — HIGH (ref 9.8–13.1)
RBC # BLD: 2.64 M/UL — LOW (ref 4.2–5.8)
RBC # FLD: 17.2 % — HIGH (ref 10.3–14.5)
RCV VOL RI: HIGH CELL/UL (ref 0–5)
SMUDGE CELLS # BLD: PRESENT — SIGNIFICANT CHANGE UP
SODIUM SERPL-SCNC: 133 MMOL/L — LOW (ref 135–145)
SPECIMEN SOURCE: SIGNIFICANT CHANGE UP
TOTAL CELLS COUNTED, BODY FLUID: 100 CELLS — SIGNIFICANT CHANGE UP
TOTAL NUCLEATED CELL COUNT, BODY FLUID: 1763 CELL/UL — HIGH (ref 0–5)
WBC # BLD: 4.48 K/UL — SIGNIFICANT CHANGE UP (ref 3.8–10.5)
WBC # FLD AUTO: 4.48 K/UL — SIGNIFICANT CHANGE UP (ref 3.8–10.5)

## 2018-03-13 PROCEDURE — 99233 SBSQ HOSP IP/OBS HIGH 50: CPT

## 2018-03-13 PROCEDURE — 73700 CT LOWER EXTREMITY W/O DYE: CPT | Mod: 26,LT

## 2018-03-13 RX ORDER — LIDOCAINE HCL 20 MG/ML
10 VIAL (ML) INJECTION ONCE
Qty: 0 | Refills: 0 | Status: DISCONTINUED | OUTPATIENT
Start: 2018-03-13 | End: 2018-03-15

## 2018-03-13 RX ADMIN — Medication 325 MILLIGRAM(S): at 12:34

## 2018-03-13 RX ADMIN — Medication 650 MILLIGRAM(S): at 21:52

## 2018-03-13 RX ADMIN — Medication 650 MILLIGRAM(S): at 05:12

## 2018-03-13 RX ADMIN — Medication 100 MILLIGRAM(S): at 05:12

## 2018-03-13 RX ADMIN — Medication 1 MILLIGRAM(S): at 12:34

## 2018-03-13 RX ADMIN — Medication 650 MILLIGRAM(S): at 06:00

## 2018-03-13 RX ADMIN — Medication 650 MILLIGRAM(S): at 22:50

## 2018-03-13 RX ADMIN — PANTOPRAZOLE SODIUM 40 MILLIGRAM(S): 20 TABLET, DELAYED RELEASE ORAL at 07:38

## 2018-03-13 NOTE — CONSULT NOTE ADULT - ASSESSMENT
62M with cirrhosis, gout, with left knee pain and swelling, most likely hemorrhagic prepatellar bursitis in setting of cirrhotic coagulopathy  - Low clinical concern for septic knee  - FU prepatellar bursa aspiration labs - cell count, gram stain/culture, crystals  - WBAT LLE  - Pain control  - ID consult if gram stain/culture positive  - If gram stain/culture neg, no further intervention and patient can f/u as an outpatient on a prn basis with Dr. Carmichael [phone]

## 2018-03-13 NOTE — PROGRESS NOTE ADULT - PROBLEM SELECTOR PLAN 1
No DVT on LE dopplers  Rash does not look like cellulitis. No leukocytosis.  Observe off ABx  Hepatitis serologies negative.  appreciate PT eval  appreciate Derm eval  ortho eval for draining of bursa  anticipate d/c today pending ortho eval  d/c time 45 min

## 2018-03-13 NOTE — CONSULT NOTE ADULT - SUBJECTIVE AND OBJECTIVE BOX
CC: L knee pain  HPI:   62yMale hx of cirrhosis, gout, and HTN with worsening left knee pain and swelling. Patients states it started about a week ago, atraumatic. Pain can get up to 10/10. States that the pain and swelling has been worsening since admission. Denies hx of previous episode. States that it does not feel like a gout flare. He is able to flex and extend his knee without pain, but complains of knee pain anterior to patella tracking down to anterior calf. Denies radiation of the pain, no numbness/tingling. Has trouble bearing weight. Denies chills or fevers, no n/v.     Review of systems: As per HPI, otherwise negative.     PAST MEDICAL & SURGICAL HISTORY:  Esophageal varices: s/p bleed July 2016, s/p banding, follows [facility] Dr Fan Lima, as of EGD 1/22/18 no further banding or EGD needed  HTN (hypertension)  Cirrhosis  Sciatica  Gout  HTN (hypertension)  History of ventral hernia repair: 11/22/16  No significant past surgical history  No significant past surgical history    FAMILY HISTORY:  No significant family history      MEDICATIONS  (STANDING):  ferrous    sulfate 325 milliGRAM(s) Oral daily  folic acid 1 milliGRAM(s) Oral daily  lidocaine 1% Injectable 10 milliLiter(s) Local Injection once  metoprolol succinate  milliGRAM(s) Oral daily  pantoprazole    Tablet 40 milliGRAM(s) Oral before breakfast    MEDICATIONS  (PRN):  acetaminophen   Tablet. 650 milliGRAM(s) Oral every 6 hours PRN mild moderate and severe pain      T(C): 37.1 (03-13-18 @ 14:29), Max: 37.3 (03-12-18 @ 21:11)  HR: 74 (03-13-18 @ 14:29) (74 - 85)  BP: 106/65 (03-13-18 @ 14:29) (106/65 - 128/74)  RR: 16 (03-13-18 @ 14:29) (16 - 16)  SpO2: 100% (03-13-18 @ 14:29) (96% - 100%)  Wt(kg): --                        9.8    4.48  )-----------( 75       ( 13 Mar 2018 16:00 )             28.8     03-13    133<L>  |  98  |  15  ----------------------------<  97  4.4   |  22  |  1.35<H>    Ca    8.3<L>      13 Mar 2018 16:00    TPro  7.1  /  Alb  2.2<L>  /  TBili  3.7<H>  /  DBili  x   /  AST  70<H>  /  ALT  18  /  AlkPhos  60  03-12      EXAM:  Awake, Alert and in no acute distress  NAD  LLE prepatellar fluctuance, increased warmth to the touch, minimal erythema, skin intact no purulent drainage  Left calf with ecchymosis tracking down anterior shin  Somewhat TTP anteromedial shin  Knee full active and passive ROM with minimal pain 0-120 deg  EHL/FHL/TA/GS intact  SILT SP/DP/T/S/S  Compartments soft and compressible  WWP brisk cap refill     Imaging  XR L knee - no acute fx or dislocation  CT L knee - IMPRESSION:   Prominent circumscribed fluid collections contiguous in the   prepatellar/lateral patellar and separate in the superficial infrapatellar   regions with internal amorphous and wispy foci of increased attenuation.   Findings could reflect posttraumatic hemorrhagic or inflammatory/infectious   bursitis. Fluid aspiration may provide additional diagnostic information as   warranted.     No tracking gas collections or CT evidence for osteomyelitis or septic   arthritis.     No fractures or dislocations.     Procedure - L knee prepatellar aspiration  Lateral approach, 1% lidocaine local, aspiration of 80 cc bloody fluid  No purulence encountered  Send for gram stain, culture, cell count, and crystals    Post-proc PE - NVI    Generalized calf subcutaneous swelling and stranding compatible with edema,   hemorrhagic infiltration, or cellulitic change. CC: L knee pain  HPI:   62yMale hx of cirrhosis, gout, and HTN with worsening left knee pain and swelling. Patients states it started about a week ago, atraumatic. Pain can get up to 10/10. States that the pain and swelling has been worsening since admission. Denies hx of previous episode. States that it does not feel like a gout flare. He is able to flex and extend his knee without pain, but complains of knee pain anterior to patella tracking down to anterior calf. Denies radiation of the pain, no numbness/tingling. Has trouble bearing weight. Denies chills or fevers, no n/v. Dopplers negative for DVT. Received clinda 3/9 and 3/10.     Review of systems: As per HPI, otherwise negative.     PAST MEDICAL & SURGICAL HISTORY:  Esophageal varices: s/p bleed July 2016, s/p banding, follows [facility] Dr Fan Lima, as of EGD 1/22/18 no further banding or EGD needed  HTN (hypertension)  Cirrhosis  Sciatica  Gout  HTN (hypertension)  History of ventral hernia repair: 11/22/16  No significant past surgical history  No significant past surgical history    FAMILY HISTORY:  No significant family history      MEDICATIONS  (STANDING):  ferrous    sulfate 325 milliGRAM(s) Oral daily  folic acid 1 milliGRAM(s) Oral daily  lidocaine 1% Injectable 10 milliLiter(s) Local Injection once  metoprolol succinate  milliGRAM(s) Oral daily  pantoprazole    Tablet 40 milliGRAM(s) Oral before breakfast    MEDICATIONS  (PRN):  acetaminophen   Tablet. 650 milliGRAM(s) Oral every 6 hours PRN mild moderate and severe pain      T(C): 37.1 (03-13-18 @ 14:29), Max: 37.3 (03-12-18 @ 21:11)  HR: 74 (03-13-18 @ 14:29) (74 - 85)  BP: 106/65 (03-13-18 @ 14:29) (106/65 - 128/74)  RR: 16 (03-13-18 @ 14:29) (16 - 16)  SpO2: 100% (03-13-18 @ 14:29) (96% - 100%)  Wt(kg): --                        9.8    4.48  )-----------( 75       ( 13 Mar 2018 16:00 )             28.8     03-13    133<L>  |  98  |  15  ----------------------------<  97  4.4   |  22  |  1.35<H>    Ca    8.3<L>      13 Mar 2018 16:00    TPro  7.1  /  Alb  2.2<L>  /  TBili  3.7<H>  /  DBili  x   /  AST  70<H>  /  ALT  18  /  AlkPhos  60  03-12      EXAM:  Awake, Alert and in no acute distress  NAD  LLE prepatellar fluctuance, increased warmth to the touch, minimal erythema, skin intact no purulent drainage  Left calf with ecchymosis tracking down anterior shin  Somewhat TTP anteromedial shin  Knee full active and passive ROM with minimal pain 0-120 deg  EHL/FHL/TA/GS intact  SILT SP/DP/T/S/S  Compartments soft and compressible  WWP brisk cap refill     Imaging  XR L knee - no acute fx or dislocation  CT L knee - IMPRESSION:   Prominent circumscribed fluid collections contiguous in the   prepatellar/lateral patellar and separate in the superficial infrapatellar   regions with internal amorphous and wispy foci of increased attenuation.   Findings could reflect posttraumatic hemorrhagic or inflammatory/infectious   bursitis. Fluid aspiration may provide additional diagnostic information as   warranted.     No tracking gas collections or CT evidence for osteomyelitis or septic   arthritis.     No fractures or dislocations.     Procedure - L knee prepatellar aspiration  Lateral approach, 1% lidocaine local, aspiration of 80 cc bloody fluid  No purulence encountered  Send for gram stain, culture, cell count, and crystals    Post-proc PE - NVI    Generalized calf subcutaneous swelling and stranding compatible with edema,   hemorrhagic infiltration, or cellulitic change. CC: L knee pain  HPI:   62yMale hx of cirrhosis, gout, and HTN with worsening left knee pain and swelling. Patients states it started about a week ago, atraumatic. Pain can get up to 10/10. States that the pain and swelling has been worsening since admission. Denies hx of previous episode. States that it does not feel like a gout flare. He is able to flex and extend his knee without pain, but complains of knee pain anterior to patella tracking down to anterior calf. Denies radiation of the pain, no numbness/tingling. Has trouble bearing weight. Denies chills or fevers, no n/v. Dopplers negative for DVT. Received clinda 3/9 and 3/10. Alcoholic cirrhosis with INR currently at 1.7, platelet 75.     Review of systems: As per HPI, otherwise negative.     PAST MEDICAL & SURGICAL HISTORY:  Esophageal varices: s/p bleed July 2016, s/p banding, follows [facility] Dr Fan Lima, as of EGD 1/22/18 no further banding or EGD needed  HTN (hypertension)  Cirrhosis  Sciatica  Gout  HTN (hypertension)  History of ventral hernia repair: 11/22/16  No significant past surgical history  No significant past surgical history    FAMILY HISTORY:  No significant family history      MEDICATIONS  (STANDING):  ferrous    sulfate 325 milliGRAM(s) Oral daily  folic acid 1 milliGRAM(s) Oral daily  lidocaine 1% Injectable 10 milliLiter(s) Local Injection once  metoprolol succinate  milliGRAM(s) Oral daily  pantoprazole    Tablet 40 milliGRAM(s) Oral before breakfast    MEDICATIONS  (PRN):  acetaminophen   Tablet. 650 milliGRAM(s) Oral every 6 hours PRN mild moderate and severe pain      T(C): 37.1 (03-13-18 @ 14:29), Max: 37.3 (03-12-18 @ 21:11)  HR: 74 (03-13-18 @ 14:29) (74 - 85)  BP: 106/65 (03-13-18 @ 14:29) (106/65 - 128/74)  RR: 16 (03-13-18 @ 14:29) (16 - 16)  SpO2: 100% (03-13-18 @ 14:29) (96% - 100%)  Wt(kg): --                        9.8    4.48  )-----------( 75       ( 13 Mar 2018 16:00 )             28.8     03-13    133<L>  |  98  |  15  ----------------------------<  97  4.4   |  22  |  1.35<H>    Ca    8.3<L>      13 Mar 2018 16:00    TPro  7.1  /  Alb  2.2<L>  /  TBili  3.7<H>  /  DBili  x   /  AST  70<H>  /  ALT  18  /  AlkPhos  60  03-12      EXAM:  Awake, Alert and in no acute distress  NAD  LLE prepatellar fluctuance, increased warmth to the touch, minimal erythema, skin intact no purulent drainage  Left calf with ecchymosis tracking down anterior shin  Somewhat TTP anteromedial shin  Knee full active and passive ROM with minimal pain 0-120 deg  EHL/FHL/TA/GS intact  SILT SP/DP/T/S/S  Compartments soft and compressible  WWP brisk cap refill     Imaging  XR L knee - no acute fx or dislocation  CT L knee - IMPRESSION:   Prominent circumscribed fluid collections contiguous in the   prepatellar/lateral patellar and separate in the superficial infrapatellar   regions with internal amorphous and wispy foci of increased attenuation.   Findings could reflect posttraumatic hemorrhagic or inflammatory/infectious   bursitis. Fluid aspiration may provide additional diagnostic information as   warranted.     No tracking gas collections or CT evidence for osteomyelitis or septic   arthritis.     No fractures or dislocations.     Procedure - L knee prepatellar aspiration  Lateral approach, 1% lidocaine local, aspiration of 80 cc bloody fluid  No purulence encountered  Send for gram stain, culture, cell count, and crystals    Post-proc PE - NVI    Generalized calf subcutaneous swelling and stranding compatible with edema,   hemorrhagic infiltration, or cellulitic change.

## 2018-03-13 NOTE — PROGRESS NOTE ADULT - SUBJECTIVE AND OBJECTIVE BOX
Patient is a 62y old  Male who presents with a chief complaint of Pain on L leg (12 Mar 2018 20:18)      SUBJECTIVE / OVERNIGHT EVENTS: Still with leg pain. States he can't go home with this much difficulty walking    MEDICATIONS  (STANDING):  ferrous    sulfate 325 milliGRAM(s) Oral daily  folic acid 1 milliGRAM(s) Oral daily  metoprolol succinate  milliGRAM(s) Oral daily  pantoprazole    Tablet 40 milliGRAM(s) Oral before breakfast    MEDICATIONS  (PRN):  acetaminophen   Tablet. 650 milliGRAM(s) Oral every 6 hours PRN mild moderate and severe pain      Vital Signs Last 24 Hrs  T(C): 37.1 (13 Mar 2018 14:29), Max: 37.3 (12 Mar 2018 21:11)  T(F): 98.7 (13 Mar 2018 14:29), Max: 99.2 (12 Mar 2018 21:11)  HR: 74 (13 Mar 2018 14:29) (74 - 85)  BP: 106/65 (13 Mar 2018 14:29) (106/65 - 128/74)  BP(mean): --  RR: 16 (13 Mar 2018 14:29) (16 - 16)  SpO2: 100% (13 Mar 2018 14:29) (96% - 100%)  CAPILLARY BLOOD GLUCOSE        I&O's Summary      PHYSICAL EXAM:  GENERAL: NAD, well-developed  HEAD:  Atraumatic, Normocephalic  EYES: EOMI, PERRLA, conjunctiva and sclera clear  NECK: Supple, No JVD  CHEST/LUNG: Clear to auscultation bilaterally; No wheeze  HEART: Regular rate and rhythm; No murmurs, rubs, or gallops  ABDOMEN: Soft, Nontender, Nondistended; Bowel sounds present  EXTREMITIES:  2+ Peripheral Pulses, No clubbing, cyanosis, or edema distally  PSYCH: AAOx3  NEUROLOGY: non-focal  SKIN: LLE 1+ edema to thigh with extensive purplish patches, no erythema or warmth.       LABS:                        9.8    4.48  )-----------( 75       ( 13 Mar 2018 16:00 )             28.8     03-13    133<L>  |  98  |  15  ----------------------------<  97  4.4   |  22  |  1.35<H>    Ca    8.3<L>      13 Mar 2018 16:00    TPro  7.1  /  Alb  2.2<L>  /  TBili  3.7<H>  /  DBili  x   /  AST  70<H>  /  ALT  18  /  AlkPhos  60  03-12    PT/INR - ( 13 Mar 2018 16:00 )   PT: 19.3 SEC;   INR: 1.72          PTT - ( 13 Mar 2018 16:00 )  PTT:36.9 SEC          RADIOLOGY & ADDITIONAL TESTS:    Imaging Personally Reviewed:  < from: CT Knee No Cont, Left (03.13.18 @ 09:17) >    EXAM:  CT KNEE ONLY LT        PROCEDURE DATE:  Mar 13 2018         INTERPRETATION:  CLINICAL INDICATION: left knee swelling; evaluate for   hematoma; hypertension; gout; cirrhosis    TECHNIQUE:  Noncontrast helical axial CT images were acquired through the left knee.   Sagittal and coronal reformatted images were also obtained. No prior CT   available for comparison. Reviewed in conjunction with left leg   radiograph from 3/11/2018.    FINDINGS:  Prominent circumscribed contiguous fluid collections in the   prepatellar/lateral patellar and separately in the superficial   infrapatellar regions with internal amorphous and wispy foci of increased   attenuation.    Generalized calf subcutaneous swelling and stranding compatible with   edema, hemorrhagic infiltration, or cellulitic change. No tracking gas   collections or radiopaque foreign bodies.    Osseous structures demonstrate normal configuration, alignment, and   marrow attenuation without evidence for fracture, dislocations,   suspicious focal marrow lesions, or osteomyelitis.    Preserved joint spaces and no joint margin erosions or joint effusions.   No CT evidence for AVN.    Visualized musculature demonstrate normal morphology and attenuation.    IMPRESSION:  Prominent circumscribed fluid collections contiguous in the   prepatellar/lateral patellar and separate in the superficial   infrapatellar regions with internal amorphous and wispy foci of increased   attenuation. Findings could reflect posttraumatic hemorrhagic or   inflammatory/infectious bursitis. Fluid aspiration may provide additional   diagnostic information as warranted.    No tracking gas collections or CT evidence for osteomyelitis or septic   arthritis.    No fractures or dislocations.    Generalized calf subcutaneous swelling and stranding compatible with   edema, hemorrhagic infiltration, or cellulitic change.                  LOBITO REECE M.D., ATTENDING RADIOLOGIST  This document has been electronically signed. Mar 13 2018 10:44AM                  < end of copied text >

## 2018-03-14 PROCEDURE — 99233 SBSQ HOSP IP/OBS HIGH 50: CPT

## 2018-03-14 RX ADMIN — Medication 650 MILLIGRAM(S): at 21:29

## 2018-03-14 RX ADMIN — PANTOPRAZOLE SODIUM 40 MILLIGRAM(S): 20 TABLET, DELAYED RELEASE ORAL at 06:09

## 2018-03-14 RX ADMIN — Medication 650 MILLIGRAM(S): at 20:39

## 2018-03-14 RX ADMIN — Medication 100 MILLIGRAM(S): at 06:09

## 2018-03-14 RX ADMIN — Medication 1 MILLIGRAM(S): at 13:51

## 2018-03-14 RX ADMIN — Medication 325 MILLIGRAM(S): at 13:51

## 2018-03-14 NOTE — PROGRESS NOTE ADULT - SUBJECTIVE AND OBJECTIVE BOX
Patient is a 62y old  Male who presents with a chief complaint of Pain on L leg (12 Mar 2018 20:18)      SUBJECTIVE / OVERNIGHT EVENTS: Had knee bursa drained last evening, says it hurts more now.     MEDICATIONS  (STANDING):  ferrous    sulfate 325 milliGRAM(s) Oral daily  folic acid 1 milliGRAM(s) Oral daily  lidocaine 1% Injectable 10 milliLiter(s) Local Injection once  metoprolol succinate  milliGRAM(s) Oral daily  pantoprazole    Tablet 40 milliGRAM(s) Oral before breakfast    MEDICATIONS  (PRN):  acetaminophen   Tablet. 650 milliGRAM(s) Oral every 6 hours PRN mild moderate and severe pain      Vital Signs Last 24 Hrs  T(C): 37.2 (14 Mar 2018 14:30), Max: 37.7 (13 Mar 2018 21:46)  T(F): 98.9 (14 Mar 2018 14:30), Max: 99.9 (13 Mar 2018 21:46)  HR: 71 (14 Mar 2018 14:30) (71 - 82)  BP: 122/81 (14 Mar 2018 14:30) (107/72 - 122/81)  BP(mean): --  RR: 19 (14 Mar 2018 14:30) (18 - 19)  SpO2: 100% (14 Mar 2018 14:30) (99% - 100%)  CAPILLARY BLOOD GLUCOSE        I&O's Summary      PHYSICAL EXAM:  GENERAL: NAD, well-developed  HEAD:  Atraumatic, Normocephalic  EYES: EOMI, PERRLA, conjunctiva and sclera clear  NECK: Supple, No JVD  CHEST/LUNG: Clear to auscultation bilaterally; No wheeze  HEART: Regular rate and rhythm; No murmurs, rubs, or gallops  ABDOMEN: Soft, Nontender, Nondistended; Bowel sounds present  EXTREMITIES:  2+ Peripheral Pulses, No clubbing, cyanosis, or edema distally  PSYCH: AAOx3  NEUROLOGY: non-focal  SKIN: LLE 1+ edema to thigh with extensive purplish patches, no erythema or warmth.     LABS:                        9.8    4.48  )-----------( 75       ( 13 Mar 2018 16:00 )             28.8     03-13    133<L>  |  98  |  15  ----------------------------<  97  4.4   |  22  |  1.35<H>    Ca    8.3<L>      13 Mar 2018 16:00      PT/INR - ( 13 Mar 2018 16:00 )   PT: 19.3 SEC;   INR: 1.72          PTT - ( 13 Mar 2018 16:00 )  PTT:36.9 SEC    Culture - Body Fluid with Gram Stain (03.13.18 @ 20:18)    Gram Stain:   NOS^No Organisms Seen  WBC^White Blood Cells  QNTY CELLS IN GRAM STAIN: FEW (2+)    Specimen Source: INTERSTITIAL FLUID        Consultant(s) Notes Reviewed:  ortho

## 2018-03-14 NOTE — PROGRESS NOTE ADULT - PROBLEM SELECTOR PLAN 1
No DVT on LE dopplers  Rash does not look like cellulitis. No leukocytosis.  Observe off ABx  Hepatitis serologies negative.  appreciate PT eval  appreciate Derm eval  Appreciate ortho eval  Will follow bursa fluid cultures, if negative will discharge with ortho f/u

## 2018-03-15 VITALS
DIASTOLIC BLOOD PRESSURE: 67 MMHG | TEMPERATURE: 99 F | SYSTOLIC BLOOD PRESSURE: 112 MMHG | HEART RATE: 72 BPM | OXYGEN SATURATION: 99 % | RESPIRATION RATE: 18 BRPM

## 2018-03-15 PROCEDURE — 99239 HOSP IP/OBS DSCHRG MGMT >30: CPT

## 2018-03-15 RX ADMIN — Medication 325 MILLIGRAM(S): at 11:31

## 2018-03-15 RX ADMIN — Medication 1 MILLIGRAM(S): at 11:31

## 2018-03-15 RX ADMIN — PANTOPRAZOLE SODIUM 40 MILLIGRAM(S): 20 TABLET, DELAYED RELEASE ORAL at 06:01

## 2018-03-15 RX ADMIN — Medication 100 MILLIGRAM(S): at 06:01

## 2018-03-15 NOTE — PROGRESS NOTE ADULT - PROBLEM SELECTOR PLAN 3
Due to cirrhosis

## 2018-03-15 NOTE — PROGRESS NOTE ADULT - PROBLEM SELECTOR PLAN 5
Low BP--d/c amlodipine, losartan and HCTZ, continue metoprolol
Low BP--d/c amlodipine, losartan and HCTZ; continue metoprolol
Low BP--d/ramos amlodipine, losartan and HCTZ; continue metoprolol, BP stable
c/w home Losartan, Metoprolol, Amlodipine, and Hctz
c/w home Losartan, Metoprolol, Amlodipine, and Hctz
Low BP--d/ramos amlodipine, losartan and HCTZ; continue metoprolol, BP stable

## 2018-03-15 NOTE — PROGRESS NOTE ADULT - SUBJECTIVE AND OBJECTIVE BOX
Patient is a 62y old  Male who presents with a chief complaint of Pain on L leg (12 Mar 2018 20:18)      SUBJECTIVE / OVERNIGHT EVENTS: Leg hurts, but able to walk with cane    MEDICATIONS  (STANDING):  ferrous    sulfate 325 milliGRAM(s) Oral daily  folic acid 1 milliGRAM(s) Oral daily  lidocaine 1% Injectable 10 milliLiter(s) Local Injection once  metoprolol succinate  milliGRAM(s) Oral daily  pantoprazole    Tablet 40 milliGRAM(s) Oral before breakfast    MEDICATIONS  (PRN):  acetaminophen   Tablet. 650 milliGRAM(s) Oral every 6 hours PRN mild moderate and severe pain      Vital Signs Last 24 Hrs  T(C): 37.1 (15 Mar 2018 05:50), Max: 37.6 (14 Mar 2018 21:48)  T(F): 98.8 (15 Mar 2018 05:50), Max: 99.6 (14 Mar 2018 21:48)  HR: 72 (15 Mar 2018 05:50) (71 - 76)  BP: 112/67 (15 Mar 2018 05:50) (109/72 - 122/81)  BP(mean): --  RR: 18 (15 Mar 2018 05:50) (18 - 19)  SpO2: 99% (15 Mar 2018 05:50) (97% - 100%)  CAPILLARY BLOOD GLUCOSE        I&O's Summary      PHYSICAL EXAM:  GENERAL: NAD, well-developed  HEAD:  Atraumatic, Normocephalic  EYES: EOMI, PERRLA, conjunctiva and sclera clear  NECK: Supple, No JVD  CHEST/LUNG: Clear to auscultation bilaterally; No wheeze  HEART: Regular rate and rhythm; No murmurs, rubs, or gallops  ABDOMEN: Soft, Nontender, Nondistended; Bowel sounds present  EXTREMITIES:  2+ Peripheral Pulses, No clubbing, cyanosis, or edema  PSYCH: AAOx3  NEUROLOGY: non-focal  SKIN: No rashes or lesions    LABS:                        9.8    4.48  )-----------( 75       ( 13 Mar 2018 16:00 )             28.8     03-13    133<L>  |  98  |  15  ----------------------------<  97  4.4   |  22  |  1.35<H>    Ca    8.3<L>      13 Mar 2018 16:00      PT/INR - ( 13 Mar 2018 16:00 )   PT: 19.3 SEC;   INR: 1.72          PTT - ( 13 Mar 2018 16:00 )  PTT:36.9 SEC          RADIOLOGY & ADDITIONAL TESTS:    Imaging Personally Reviewed:    Consultant(s) Notes Reviewed:      Care Discussed with Consultants/Other Providers: Patient is a 62y old  Male who presents with a chief complaint of Pain on L leg (12 Mar 2018 20:18)      SUBJECTIVE / OVERNIGHT EVENTS: Leg hurts, but able to walk with cane    MEDICATIONS  (STANDING):  ferrous    sulfate 325 milliGRAM(s) Oral daily  folic acid 1 milliGRAM(s) Oral daily  lidocaine 1% Injectable 10 milliLiter(s) Local Injection once  metoprolol succinate  milliGRAM(s) Oral daily  pantoprazole    Tablet 40 milliGRAM(s) Oral before breakfast    MEDICATIONS  (PRN):  acetaminophen   Tablet. 650 milliGRAM(s) Oral every 6 hours PRN mild moderate and severe pain      Vital Signs Last 24 Hrs  T(C): 37.1 (15 Mar 2018 05:50), Max: 37.6 (14 Mar 2018 21:48)  T(F): 98.8 (15 Mar 2018 05:50), Max: 99.6 (14 Mar 2018 21:48)  HR: 72 (15 Mar 2018 05:50) (71 - 76)  BP: 112/67 (15 Mar 2018 05:50) (109/72 - 122/81)  BP(mean): --  RR: 18 (15 Mar 2018 05:50) (18 - 19)  SpO2: 99% (15 Mar 2018 05:50) (97% - 100%)  CAPILLARY BLOOD GLUCOSE        I&O's Summary      PHYSICAL EXAM:  GENERAL: NAD, well-developed  HEAD:  Atraumatic, Normocephalic  EYES: EOMI, PERRLA, conjunctiva and sclera clear  NECK: Supple, No JVD  CHEST/LUNG: Clear to auscultation bilaterally; No wheeze  HEART: Regular rate and rhythm; No murmurs, rubs, or gallops  ABDOMEN: Soft, Nontender, Nondistended; Bowel sounds present  EXTREMITIES:  2+ Peripheral Pulses, No clubbing, cyanosis, or edema  PSYCH: AAOx3  NEUROLOGY: non-focal  SKIN: No rashes or lesions    LABS:                        9.8    4.48  )-----------( 75       ( 13 Mar 2018 16:00 )             28.8     03-13    133<L>  |  98  |  15  ----------------------------<  97  4.4   |  22  |  1.35<H>    Ca    8.3<L>      13 Mar 2018 16:00      PT/INR - ( 13 Mar 2018 16:00 )   PT: 19.3 SEC;   INR: 1.72          PTT - ( 13 Mar 2018 16:00 )  PTT:36.9 SEC

## 2018-03-15 NOTE — PROGRESS NOTE ADULT - PROBLEM SELECTOR PLAN 1
No DVT on LE dopplers  Rash does not look like cellulitis. No leukocytosis.  Observe off ABx  Hepatitis serologies negative.  appreciate PT eval  appreciate Derm eval  Appreciate ortho eval  Bursa fluid cx and gram stain negative.  D/c home with ortho f/u. d/c time 45 min

## 2018-03-15 NOTE — PROGRESS NOTE ADULT - PROBLEM SELECTOR PROBLEM 3
Thrombocytopenia

## 2018-03-15 NOTE — PROGRESS NOTE ADULT - PROBLEM SELECTOR PLAN 4
Patient with anemia in the setting of cirrhosis. Likely anemia of chronic disease. iron studies show adequate iron stores

## 2018-03-19 LAB — BACTERIA FLD CULT: SIGNIFICANT CHANGE UP

## 2018-03-28 ENCOUNTER — APPOINTMENT (OUTPATIENT)
Dept: ORTHOPEDIC SURGERY | Facility: HOSPITAL | Age: 63
End: 2018-03-28

## 2018-07-17 PROBLEM — I85.00 ESOPHAGEAL VARICES WITHOUT BLEEDING: Chronic | Status: ACTIVE | Noted: 2018-03-10

## 2018-09-04 ENCOUNTER — APPOINTMENT (OUTPATIENT)
Dept: GASTROENTEROLOGY | Facility: CLINIC | Age: 63
End: 2018-09-04

## 2022-10-27 NOTE — PATIENT PROFILE ADULT. - NS TRANSFER RESPONSE BELONGING
Detail Level: Detailed
Treatment Number (Will Not Render If 0): 2
Consent: The patient's consent was obtained including but not limited to risks of crusting, scabbing, blistering, scarring, darker or lighter pigmentary change, recurrence, incomplete removal and infection.
Medical Necessity Information: It is in your best interest to select a reason for this procedure from the list below. All of these items fulfill various CMS LCD requirements except the new and changing color options.
Render Post-Care Instructions In Note?: no
Post-Care Instructions: I reviewed with the patient in detail post-care instructions. Patient is to wear sunprotection, and avoid picking at any of the treated lesions. Pt may apply Vaseline to crusted or scabbing areas.
Medical Necessity Clause: This procedure was medically necessary because the lesions that were treated were:
Anesthesia Volume In Cc: 0.5
yes
